# Patient Record
Sex: FEMALE
[De-identification: names, ages, dates, MRNs, and addresses within clinical notes are randomized per-mention and may not be internally consistent; named-entity substitution may affect disease eponyms.]

---

## 2022-11-24 ENCOUNTER — NON-APPOINTMENT (OUTPATIENT)
Age: 32
End: 2022-11-24

## 2024-06-19 ENCOUNTER — ASOB RESULT (OUTPATIENT)
Age: 34
End: 2024-06-19

## 2024-06-19 ENCOUNTER — APPOINTMENT (OUTPATIENT)
Dept: ANTEPARTUM | Facility: CLINIC | Age: 34
End: 2024-06-19
Payer: COMMERCIAL

## 2024-06-19 PROCEDURE — 36416 COLLJ CAPILLARY BLOOD SPEC: CPT

## 2024-06-19 PROCEDURE — 76813 OB US NUCHAL MEAS 1 GEST: CPT

## 2024-08-07 ENCOUNTER — APPOINTMENT (OUTPATIENT)
Dept: ANTEPARTUM | Facility: CLINIC | Age: 34
End: 2024-08-07

## 2024-08-07 ENCOUNTER — ASOB RESULT (OUTPATIENT)
Age: 34
End: 2024-08-07

## 2024-08-07 ENCOUNTER — TRANSCRIPTION ENCOUNTER (OUTPATIENT)
Age: 34
End: 2024-08-07

## 2024-08-07 PROCEDURE — 76811 OB US DETAILED SNGL FETUS: CPT

## 2024-08-14 ENCOUNTER — NON-APPOINTMENT (OUTPATIENT)
Age: 34
End: 2024-08-14

## 2024-08-15 ENCOUNTER — ASOB RESULT (OUTPATIENT)
Age: 34
End: 2024-08-15

## 2024-08-15 ENCOUNTER — APPOINTMENT (OUTPATIENT)
Dept: ANTEPARTUM | Facility: CLINIC | Age: 34
End: 2024-08-15

## 2024-08-15 PROCEDURE — 76816 OB US FOLLOW-UP PER FETUS: CPT

## 2024-10-08 ENCOUNTER — APPOINTMENT (OUTPATIENT)
Dept: ANTEPARTUM | Facility: CLINIC | Age: 34
End: 2024-10-08
Payer: COMMERCIAL

## 2024-10-08 ENCOUNTER — ASOB RESULT (OUTPATIENT)
Age: 34
End: 2024-10-08

## 2024-10-08 PROCEDURE — 76816 OB US FOLLOW-UP PER FETUS: CPT

## 2024-11-01 ENCOUNTER — ASOB RESULT (OUTPATIENT)
Age: 34
End: 2024-11-01

## 2024-11-01 ENCOUNTER — APPOINTMENT (OUTPATIENT)
Dept: MATERNAL FETAL MEDICINE | Facility: CLINIC | Age: 34
End: 2024-11-01
Payer: COMMERCIAL

## 2024-11-01 PROCEDURE — G0108 DIAB MANAGE TRN  PER INDIV: CPT | Mod: 95

## 2024-11-06 ENCOUNTER — ASOB RESULT (OUTPATIENT)
Age: 34
End: 2024-11-06

## 2024-11-06 ENCOUNTER — APPOINTMENT (OUTPATIENT)
Dept: ANTEPARTUM | Facility: CLINIC | Age: 34
End: 2024-11-06
Payer: COMMERCIAL

## 2024-11-06 PROCEDURE — 76819 FETAL BIOPHYS PROFIL W/O NST: CPT

## 2024-11-06 PROCEDURE — 76816 OB US FOLLOW-UP PER FETUS: CPT

## 2024-11-12 DIAGNOSIS — O24.419 GESTATIONAL DIABETES MELLITUS IN PREGNANCY, UNSPECIFIED CONTROL: ICD-10-CM

## 2024-11-12 RX ORDER — LANCETS 33 GAUGE
EACH MISCELLANEOUS
Qty: 1 | Refills: 2 | Status: ACTIVE | COMMUNITY
Start: 2024-11-12 | End: 1900-01-01

## 2024-11-12 RX ORDER — BLOOD-GLUCOSE METER
KIT MISCELLANEOUS
Qty: 2 | Refills: 2 | Status: ACTIVE | COMMUNITY
Start: 2024-11-12 | End: 1900-01-01

## 2024-11-15 ENCOUNTER — ASOB RESULT (OUTPATIENT)
Age: 34
End: 2024-11-15

## 2024-11-15 ENCOUNTER — APPOINTMENT (OUTPATIENT)
Dept: MATERNAL FETAL MEDICINE | Facility: CLINIC | Age: 34
End: 2024-11-15
Payer: COMMERCIAL

## 2024-11-15 PROCEDURE — G0108 DIAB MANAGE TRN  PER INDIV: CPT | Mod: 95

## 2024-11-15 RX ORDER — URINE ACETONE TEST STRIPS
STRIP MISCELLANEOUS
Qty: 1 | Refills: 2 | Status: ACTIVE | COMMUNITY
Start: 2024-11-15 | End: 1900-01-01

## 2024-11-29 ENCOUNTER — APPOINTMENT (OUTPATIENT)
Dept: ANTEPARTUM | Facility: CLINIC | Age: 34
End: 2024-11-29

## 2024-12-02 ENCOUNTER — ASOB RESULT (OUTPATIENT)
Age: 34
End: 2024-12-02

## 2024-12-02 ENCOUNTER — APPOINTMENT (OUTPATIENT)
Dept: MATERNAL FETAL MEDICINE | Facility: CLINIC | Age: 34
End: 2024-12-02
Payer: COMMERCIAL

## 2024-12-02 PROCEDURE — G0108 DIAB MANAGE TRN  PER INDIV: CPT | Mod: 95

## 2024-12-19 ENCOUNTER — INPATIENT (INPATIENT)
Facility: HOSPITAL | Age: 34
LOS: 3 days | Discharge: HOME | End: 2024-12-23
Attending: STUDENT IN AN ORGANIZED HEALTH CARE EDUCATION/TRAINING PROGRAM | Admitting: STUDENT IN AN ORGANIZED HEALTH CARE EDUCATION/TRAINING PROGRAM
Payer: COMMERCIAL

## 2024-12-19 VITALS
DIASTOLIC BLOOD PRESSURE: 65 MMHG | TEMPERATURE: 98 F | SYSTOLIC BLOOD PRESSURE: 108 MMHG | RESPIRATION RATE: 16 BRPM | HEART RATE: 93 BPM

## 2024-12-19 DIAGNOSIS — O24.419 GESTATIONAL DIABETES MELLITUS IN PREGNANCY, UNSPECIFIED CONTROL: ICD-10-CM

## 2024-12-19 LAB
BASOPHILS # BLD AUTO: 0.03 K/UL — SIGNIFICANT CHANGE UP (ref 0–0.2)
BASOPHILS NFR BLD AUTO: 0.3 % — SIGNIFICANT CHANGE UP (ref 0–2)
BLD GP AB SCN SERPL QL: NEGATIVE — SIGNIFICANT CHANGE UP
EOSINOPHIL # BLD AUTO: 0.12 K/UL — SIGNIFICANT CHANGE UP (ref 0–0.5)
EOSINOPHIL NFR BLD AUTO: 1.3 % — SIGNIFICANT CHANGE UP (ref 0–6)
GLUCOSE BLDC GLUCOMTR-MCNC: 85 MG/DL — SIGNIFICANT CHANGE UP (ref 70–99)
HCT VFR BLD CALC: 34.8 % — SIGNIFICANT CHANGE UP (ref 34.5–45)
HGB BLD-MCNC: 11.1 G/DL — LOW (ref 11.5–15.5)
IANC: 6.03 K/UL — SIGNIFICANT CHANGE UP (ref 1.8–7.4)
IMM GRANULOCYTES NFR BLD AUTO: 0.9 % — SIGNIFICANT CHANGE UP (ref 0–0.9)
LYMPHOCYTES # BLD AUTO: 2.29 K/UL — SIGNIFICANT CHANGE UP (ref 1–3.3)
LYMPHOCYTES # BLD AUTO: 24.4 % — SIGNIFICANT CHANGE UP (ref 13–44)
MCHC RBC-ENTMCNC: 29.4 PG — SIGNIFICANT CHANGE UP (ref 27–34)
MCHC RBC-ENTMCNC: 31.9 G/DL — LOW (ref 32–36)
MCV RBC AUTO: 92.3 FL — SIGNIFICANT CHANGE UP (ref 80–100)
MONOCYTES # BLD AUTO: 0.84 K/UL — SIGNIFICANT CHANGE UP (ref 0–0.9)
MONOCYTES NFR BLD AUTO: 8.9 % — SIGNIFICANT CHANGE UP (ref 2–14)
NEUTROPHILS # BLD AUTO: 6.03 K/UL — SIGNIFICANT CHANGE UP (ref 1.8–7.4)
NEUTROPHILS NFR BLD AUTO: 64.2 % — SIGNIFICANT CHANGE UP (ref 43–77)
NRBC # BLD: 0 /100 WBCS — SIGNIFICANT CHANGE UP (ref 0–0)
NRBC # FLD: 0 K/UL — SIGNIFICANT CHANGE UP (ref 0–0)
PLATELET # BLD AUTO: 183 K/UL — SIGNIFICANT CHANGE UP (ref 150–400)
RBC # BLD: 3.77 M/UL — LOW (ref 3.8–5.2)
RBC # FLD: 15.5 % — HIGH (ref 10.3–14.5)
RH IG SCN BLD-IMP: NEGATIVE — SIGNIFICANT CHANGE UP
WBC # BLD: 9.39 K/UL — SIGNIFICANT CHANGE UP (ref 3.8–10.5)
WBC # FLD AUTO: 9.39 K/UL — SIGNIFICANT CHANGE UP (ref 3.8–10.5)

## 2024-12-19 RX ORDER — CHLORHEXIDINE GLUCONATE 1.2 MG/ML
1 RINSE ORAL DAILY
Refills: 0 | Status: DISCONTINUED | OUTPATIENT
Start: 2024-12-19 | End: 2024-12-21

## 2024-12-19 RX ORDER — CITRIC ACID/SODIUM CITRATE 334-500MG
15 SOLUTION, ORAL ORAL EVERY 6 HOURS
Refills: 0 | Status: DISCONTINUED | OUTPATIENT
Start: 2024-12-19 | End: 2024-12-21

## 2024-12-19 RX ORDER — SODIUM CHLORIDE 9 MG/ML
1000 INJECTION, SOLUTION INTRAVENOUS
Refills: 0 | Status: DISCONTINUED | OUTPATIENT
Start: 2024-12-19 | End: 2024-12-22

## 2024-12-19 RX ORDER — SODIUM CHLORIDE 9 MG/ML
1000 INJECTION, SOLUTION INTRAVENOUS
Refills: 0 | Status: DISCONTINUED | OUTPATIENT
Start: 2024-12-19 | End: 2024-12-21

## 2024-12-19 RX ORDER — AMPICILLIN TRIHYDRATE 125 MG/5ML
2 SUSPENSION, RECONSTITUTED, ORAL (ML) ORAL ONCE
Refills: 0 | Status: COMPLETED | OUTPATIENT
Start: 2024-12-19 | End: 2024-12-19

## 2024-12-19 RX ORDER — OXYTOCIN IN 5 % DEXTROSE 20/500ML
167 PLASTIC BAG, INJECTION (ML) INTRAVENOUS
Qty: 30 | Refills: 0 | Status: DISCONTINUED | OUTPATIENT
Start: 2024-12-19 | End: 2024-12-22

## 2024-12-19 RX ORDER — AMPICILLIN TRIHYDRATE 125 MG/5ML
1 SUSPENSION, RECONSTITUTED, ORAL (ML) ORAL EVERY 4 HOURS
Refills: 0 | Status: DISCONTINUED | OUTPATIENT
Start: 2024-12-19 | End: 2024-12-21

## 2024-12-19 RX ADMIN — SODIUM CHLORIDE 125 MILLILITER(S): 9 INJECTION, SOLUTION INTRAVENOUS at 22:57

## 2024-12-19 RX ADMIN — Medication 200 GRAM(S): at 22:50

## 2024-12-19 NOTE — OB PROVIDER H&P - NSHPPHYSICALEXAM_GEN_ALL_CORE
T(C): 36.8 (12-19-24 @ 20:22), Max: 36.8 (12-19-24 @ 20:22)  HR: 100 (12-19-24 @ 20:22) (100 - 100)  BP: 122/74 (12-19-24 @ 20:22) (122/74 - 122/74)  RR: 16 (12-19-24 @ 20:22) (16 - 16)  SpO2: 97% (12-19-24 @ 20:22) (97% - 97%)    Gen: NAD, well-appearing, AAOx3   Abd: Soft, gravid  Ext: non-tender, non-edematous    SVE:    Bedside sono:  FHT:  [place]: T(C): 36.8 (12-19-24 @ 20:22), Max: 36.8 (12-19-24 @ 20:22)  HR: 100 (12-19-24 @ 20:22) (100 - 100)  BP: 122/74 (12-19-24 @ 20:22) (122/74 - 122/74)  RR: 16 (12-19-24 @ 20:22) (16 - 16)  SpO2: 97% (12-19-24 @ 20:22) (97% - 97%)    Gen: NAD, well-appearing, AAOx3   Abd: Soft, gravid  Ext: non-tender, non-edematous    SVE: .5/60/-3   Bedside sono: Vertex  FHT: Baseline 140, mod variability, +accels, -decels  [place]: q5min

## 2024-12-19 NOTE — OB RN PATIENT PROFILE - FALL HARM RISK - PATIENT NEEDS ASSISTANCE
Breast cancer    Hypertension    OA (osteoarthritis)    Ovarian cancer    SLE (systemic lupus erythematosus)  per patient, not noted on outpatient records
No assistance needed

## 2024-12-19 NOTE — OB PROVIDER H&P - ASSESSMENT
34y  at 39w4d presents to L&D for IOL i/s/o GDMA1.  -Admit to L&D  -Consent  -Admission labs  -NPO, except ice chips   -IV fluids  -Labor: Intact/*ROM. Latent/Active labor. Suzanne *.   -Fetus: Cat I tracing. Continuous toco and fetal monitoring.   -GBS: Negative, no GBS ppx required   -Analgesia:     Discussed with  _   34y  at 39w4d presents to L&D for IOL GDMA1.  -Admit to L&D  -Consent  -Admission labs  -NPO  -IV fluids  -Labor: 0.5/50/-3  -Fetus: Cat I tracing. Continuous toco and fetal monitoring.   -GBS: pos AMP  -Analgesia: Epidural PRN  Will plan for IOL with buccal cytotec.    Discussed with Dr. Jamie Faye, PGY-1

## 2024-12-19 NOTE — OB RN TRIAGE NOTE - FALL HARM RISK - RISK INTERVENTIONS

## 2024-12-19 NOTE — OB RN TRIAGE NOTE - TEMPERATURE IN FAHRENHEIT (DEGREES F)
Patient ID:  Ro Stone 12/31/2020  2:21 PM  [id number]  1991 29 year old    CHIEF COMPLAINT:  Dyspnea        Code status: Prior      This physician, recommends Ro Stone be admitted as an INPATIENT to the medical unit/ICU. The expected LOS exceeds 2 midnights. Reason(s) for INPATIENT CARE include acute hypoxemic respiratory failure secondary to COVID. based on severity of presenting sx, co-morbidities, and lab/imaging, this patient has an increased risk of adverse outcomes.        HPI:  Patient is a 29-year-old female with past medical history detailed below.  Patient presents with dyspnea and hypoxia.  Patient presented to outside clinic 2 days ago with dyspnea.  COVID swab was run.  She was given 1 dose of steroids and sent home.  Despite this patient's symptoms continue to worsen.  She was called today and told that her COVID was positive.  Patient's breathing is labored.  Desaturates to the mid 80s on room air.  Somewhat improved after nebulizer treatment given but still hypoxemic.  Workup showed COVID-19 pneumonia picture.  Denies additional symptoms other than fatigue.    PAST MEDICAL HISTORY:     Chronic nausea                                                Chronic abdominal pain                                        Extrinsic asthma                                              Anxiety                                                       Allergic rhinitis                                             Globus pharyngeus                                             Migraines                                                     Bronchitis                                      2017            Comment: acute     Gastroesophageal reflux disease                               Left ACL tear                                   05/29/2018    Localization-related epilepsy (CMS/HCC)                       Seizures (CMS/[facility])                                            History of postoperative nausea                                PAST SURGICAL HISTORY:    COLONOSCOPY                                     2012          ESOPHAGOGASTRODUODENOSCOPY                      2012          SHOULDER ARTHROSCOPY W/ LABRAL REPAIR           10/17/2016      Comment: Dr Zavala -extensive debridement     TUMOR EXCISION                                                  Comment: right upper chest    WRIST SURGERY                                   01/30/2018      Comment: arthroscopy w/debridement and FIRST DORSAL                COMPARTMENT RELEASE AND FLEXOR CARPI RADIALIS                INJECTION     BREAST SURGERY                                  08/08/2017      Comment: excision of breast nodule- Dr Alexy Moody                -benign     KNEE ARTHROSCOPY W/ ACL RECONSTRUCTION          03/2018       TONSILLECTOMY                                   12/18/2019    SINUS SURGERY                                   11/2019       FAMILY HISTORY:  Family History   Problem Relation Age of Onset   • Patient is unaware of any medical problems Mother    • Asthma Father    • Hypertension Father    • Cancer Father         bladder   • Asthma Sister    • Myocardial Infarction Paternal Grandmother    • Cancer Paternal Grandfather         eye cancer       SOCIAL HISTORY:  Social History     Socioeconomic History   • Marital status: /Civil Union     Spouse name: Not on file   • Number of children: Not on file   • Years of education: Not on file   • Highest education level: Not on file   Occupational History   • Occupation:      Comment: [employer]   Social Needs   • Financial resource strain: Not on file   • Food insecurity     Worry: Not on file     Inability: Not on file   • Transportation needs     Medical: Not on file     Non-medical: Not on file   Tobacco Use   • Smoking status: Never Smoker   • Smokeless tobacco: Never Used   Substance and Sexual Activity   • Alcohol use: Yes     Frequency: Never     Binge frequency: Never      Comment: 1 drink per month - if that    • Drug use: No   • Sexual activity: Never   Lifestyle   • Physical activity     Days per week: Not on file     Minutes per session: Not on file   • Stress: Not on file   Relationships   • Social connections     Talks on phone: Not on file     Gets together: Not on file     Attends Buddhist service: Not on file     Active member of club or organization: Not on file     Attends meetings of clubs or organizations: Not on file     Relationship status: Not on file   • Intimate partner violence     Fear of current or ex partner: Not on file     Emotionally abused: Not on file     Physically abused: Not on file     Forced sexual activity: Not on file   Other Topics Concern   • Not on file   Social History Narrative   • Not on file       MEDICATIONS:   (Not in a hospital admission)      ALLERGIES:  Allergies as of 12/31/2020 - Reviewed 12/29/2020   Allergen Reaction Noted   • Dander Other (See Comments) 10/19/2020   • Mite (d. farinae) Other (See Comments) 10/19/2020   • Pollen Other (See Comments) 10/19/2020       REVIEW OF SYSTEMS:  Positive for:  Dyspnea and fatigue  Denies: N/V/D/F/C/CP/palpitations  All other systems reviewed and negative.      PHYSICAL EXAM:  VITAL SIGNS:   Visit Vitals  /74   Pulse (!) 107   Temp 98.1 °F (36.7 °C)   Resp (!) 22   Wt 97 kg   LMP  (LMP Unknown)   SpO2 93%   BMI 36.71 kg/m²     GEN: Alert, oriented x 3, no acute distress. Well developed female appears stated age.  EYES: PERRLA, no scleral icterus, conjunctiva are pink.  ENT: Oral mucous membranes are moist. No oral lesions.  NECK: Supple. Trachea is midline. No thyromegaly.  CV: Regular rate and rhythm. No murmurs.  LUNGS:  Wheezes throughout lungs bilaterally.  Somewhat increased respiratory effort.  ABD: Soft, non-tender, non-distended, active bowel sounds. No HSM appreciated.  EXT: No edema, cyanosis or clubbing.  MS: Normal muscle mass and tone. Normal ROM.  SKIN: Warm and dry. No  katy.       LABS:   Lab Results   Component Value Date    SODIUM 142 12/31/2020    POTASSIUM 3.3 (L) 12/31/2020    CHLORIDE 104 12/31/2020    CO2 28 12/31/2020    GLUCOSE 88 12/31/2020    BUN 18 12/31/2020    CREATININE 0.85 12/31/2020    ALBUMIN 3.3 (L) 12/31/2020    BILIRUBIN 0.2 12/31/2020    LACTA 1.5 12/31/2020    AST 20 12/31/2020     Lab Results   Component Value Date    WBC 3.9 (L) 12/31/2020    HGB 11.7 (L) 12/31/2020    HCT 36.6 12/31/2020     12/31/2020    RAPDTR <0.02 12/31/2020    TSH 0.835 07/30/2020         DIAGNOSTICS:   Xr Chest Ap Or Pa - Portable    Result Date: 12/31/2020  Narrative: XR CHEST AP OR PA INDICATION:  SOB COMPARISON:  2 view chest August 2020, CT chest from July 2019. FINDINGS: Single view chest The lung volume is low. There are patchy areas of interstitial and airspace opacity identified in the lung bases. These are identified in retrocardiac region on the left and adjacent to the diaphragm on the right. The lungs elsewhere show crowded lung and low volume changes but no infiltrates. The heart size normal. The vascularity is normal. There is no pleural effusion.     Impression: IMPRESSION: Patchy bibasal atelectatic changes/infiltrative changes. These findings may be related to low volume chest but in light of the patient's positive Covid status these infiltrates could represent Covid related pneumonia.. 2. Low volume chest, normal heart and vascularity       ASSESSMENT AND PLAN:  1. Acute hypoxemic respiratory failure secondary to COVID pneumonia  -Decadron and remdesivir  -magnesium will be given x1  -COVID labs will be followed  2. Acute asthma exacerbation secondary to above  -Q 4 nebulizer inhalers  -continue home inhalers  -IV magnesium  3. Anxiety and depression  -continue BuSpar and Effexor  -Xanax as needed  4. Seizure disorder  -continue home medications  -Ativan as needed  5. Muscle spasms  -continue home muscle relaxer  6. Chronic iron deficiency  anemia  -continue daily iron supplementation  7. GERD  -ppi therapy  8. FEN/prophylaxis/disposition  -no IV fluids  -electrolyte replacement as needed  -SCDs, Lovenox  -general diet  -admit    Drew Rodriguez MD  12/31/2020  4:49 PM    97.5

## 2024-12-19 NOTE — OB RN PATIENT PROFILE - SUICIDE SCREENING QUESTION 2
Spoke with the patient's daughter and confirmed Evals: Waitlist appointments on Dec 2  and dec 3.  Informed patient an itinerary can be accessed via [facility], and will be sent via mail.   No

## 2024-12-19 NOTE — OB PROVIDER H&P - HISTORY OF PRESENT ILLNESS
34y G_P_ at _ weeks GA by LMP consistent with _ trimester sono who presents to L&D for _. Patient denies vaginal bleeding, contractions and leakage of fluid. She endorses good fetal movement. Denies fevers, chills, nausea, vomiting, chest pain, SOB, dizziness and headache. No other complaints at this time.   VICENTA: _  LMP: _  Prenatal course is significant for:  Prenatal course uncomplicated.      POB:  PGYN: -fibroids, -ovarian cysts, denies STD hx, denies abnormal PAPs   PMH: Denies  PSH: Denies  SH: Denies EtOH, tobacco and illicit drug use during this pregnancy; feels safe at home   Meds: PNVs  Allergies: NKDA    BMI:  Sono:  EFW:     T(C): 36.8 (12-19-24 @ 20:22), Max: 36.8 (12-19-24 @ 20:22)  HR: 100 (12-19-24 @ 20:22) (100 - 100)  BP: 122/74 (12-19-24 @ 20:22) (122/74 - 122/74)  RR: 16 (12-19-24 @ 20:22) (16 - 16)  SpO2: 97% (12-19-24 @ 20:22) (97% - 97%)    Gen: NAD, well-appearing, AAOx3   Abd: Soft, gravid  Ext: non-tender, non-edematous  SSE:   SVE:    Bedside sono:  FHT:  [place]:       A/P:   -Admit to L&D  -Consent  -Admission labs  -NPO, except ice chips   -IV fluids  -Labor: Intact/*ROM. Latent/Active labor. Suzanne *.   -Fetus: Cat I tracing. Continuous toco and fetal monitoring.   -GBS: Negative, no GBS ppx required   -Analgesia:     Discussed with  _ 34y  at 39w4d presents to L&D for IOL i/s/o GDMA1. Pt reports experiencing contractions, fetal movement present, denies leakage of fluid or vaginal bleeding. Denies fevers, chills, nausea, vomiting, chest pain, SOB, dizziness and headache. No other complaints at this time.     EDD1    PNC: GDMA1, GBSpos, EFW 3800g    POB: Denies  PGYN: -fibroids, -ovarian cysts, -endometriosis, denies STD hx, denies abnormal PAPs (last pap 2024)  PMH: anemia (s/p 5 iron infusions this pregnancy)  PSH: denies  SH: Denies EtOH, tobacco and illicit drug use during this pregnancy  Meds: PNVs, Aspirin 81mg, famotidine  Allergies: Watermelon (itchy skin + throat)   34y  at 39w4d presents to L&D for IOL for GDMA1. Pt reports experiencing contractions, fetal movement present, denies leakage of fluid or vaginal bleeding. Denies fevers, chills, nausea, vomiting, chest pain, SOB, dizziness and headache. No other complaints at this time.     EDD1    PNC: GDMA1, GBSpos, EFW 3800g    POB: Denies  PGYN: -fibroids, -ovarian cysts, -endometriosis, denies STD hx, denies abnormal PAPs (last pap 2024)  PMH: anemia (s/p 5 iron infusions this pregnancy)  PSH: denies  SH: Denies EtOH, tobacco and illicit drug use during this pregnancy  Meds: PNVs, Aspirin 81mg, famotidine  Allergies: Watermelon (itchy skin + throat)

## 2024-12-20 LAB
GLUCOSE BLDC GLUCOMTR-MCNC: 78 MG/DL — SIGNIFICANT CHANGE UP (ref 70–99)
GLUCOSE BLDC GLUCOMTR-MCNC: 82 MG/DL — SIGNIFICANT CHANGE UP (ref 70–99)
GLUCOSE BLDC GLUCOMTR-MCNC: 83 MG/DL — SIGNIFICANT CHANGE UP (ref 70–99)
GLUCOSE BLDC GLUCOMTR-MCNC: 88 MG/DL — SIGNIFICANT CHANGE UP (ref 70–99)
GLUCOSE BLDC GLUCOMTR-MCNC: 89 MG/DL — SIGNIFICANT CHANGE UP (ref 70–99)
GLUCOSE BLDC GLUCOMTR-MCNC: 91 MG/DL — SIGNIFICANT CHANGE UP (ref 70–99)
RH IG SCN BLD-IMP: NEGATIVE — SIGNIFICANT CHANGE UP
T PALLIDUM AB TITR SER: NEGATIVE — SIGNIFICANT CHANGE UP

## 2024-12-20 RX ORDER — FAMOTIDINE 20 MG/1
20 TABLET, FILM COATED ORAL ONCE
Refills: 0 | Status: COMPLETED | OUTPATIENT
Start: 2024-12-20 | End: 2024-12-20

## 2024-12-20 RX ORDER — OXYTOCIN IN 5 % DEXTROSE 20/500ML
2 PLASTIC BAG, INJECTION (ML) INTRAVENOUS
Qty: 30 | Refills: 0 | Status: DISCONTINUED | OUTPATIENT
Start: 2024-12-20 | End: 2024-12-22

## 2024-12-20 RX ADMIN — Medication 108 GRAM(S): at 14:11

## 2024-12-20 RX ADMIN — FAMOTIDINE 20 MILLIGRAM(S): 20 TABLET, FILM COATED ORAL at 01:32

## 2024-12-20 RX ADMIN — Medication 108 GRAM(S): at 02:05

## 2024-12-20 RX ADMIN — Medication 108 GRAM(S): at 18:07

## 2024-12-20 RX ADMIN — Medication 2 MILLIUNIT(S)/MIN: at 17:35

## 2024-12-20 RX ADMIN — Medication 108 GRAM(S): at 10:10

## 2024-12-20 RX ADMIN — CHLORHEXIDINE GLUCONATE 1 APPLICATION(S): 1.2 RINSE ORAL at 22:30

## 2024-12-20 RX ADMIN — Medication 108 GRAM(S): at 22:27

## 2024-12-20 RX ADMIN — SODIUM CHLORIDE 125 MILLILITER(S): 9 INJECTION, SOLUTION INTRAVENOUS at 04:10

## 2024-12-20 RX ADMIN — Medication 108 GRAM(S): at 06:09

## 2024-12-20 NOTE — OB PROVIDER LABOR PROGRESS NOTE - ASSESSMENT
34y  at 39w5d IOL GDMA1. CB now out    -Cat I tracing, ctx q3-5min  -Will plan to start pit  -Continue maternal/fetal monitoring.    D/W Dr. Arnie Hdz MD PGY1
Will continue with buccal cytotec    Plan per Dr. Jamie Faye, PGY-1

## 2024-12-20 NOTE — OB PROVIDER LABOR PROGRESS NOTE - NS_SUBJECTIVE/OBJECTIVE_OBGYN_ALL_OB_FT
Pt seen and examined at bedside for placement of cervical balloon. Pt comfortable and tolerated procedure well.
Patient laying comfortably supine, epidural in place. CB now out

## 2024-12-21 ENCOUNTER — TRANSCRIPTION ENCOUNTER (OUTPATIENT)
Age: 34
End: 2024-12-21

## 2024-12-21 LAB
ANION GAP SERPL CALC-SCNC: 15 MMOL/L — HIGH (ref 7–14)
BUN SERPL-MCNC: 6 MG/DL — LOW (ref 7–23)
CALCIUM SERPL-MCNC: 8.3 MG/DL — LOW (ref 8.4–10.5)
CHLORIDE SERPL-SCNC: 105 MMOL/L — SIGNIFICANT CHANGE UP (ref 98–107)
CO2 SERPL-SCNC: 14 MMOL/L — LOW (ref 22–31)
CREAT SERPL-MCNC: 0.69 MG/DL — SIGNIFICANT CHANGE UP (ref 0.5–1.3)
EGFR: 115 ML/MIN/1.73M2 — SIGNIFICANT CHANGE UP
GLUCOSE BLDC GLUCOMTR-MCNC: 108 MG/DL — HIGH (ref 70–99)
GLUCOSE BLDC GLUCOMTR-MCNC: 109 MG/DL — HIGH (ref 70–99)
GLUCOSE BLDC GLUCOMTR-MCNC: 87 MG/DL — SIGNIFICANT CHANGE UP (ref 70–99)
GLUCOSE BLDC GLUCOMTR-MCNC: 93 MG/DL — SIGNIFICANT CHANGE UP (ref 70–99)
GLUCOSE SERPL-MCNC: 92 MG/DL — SIGNIFICANT CHANGE UP (ref 70–99)
HCT VFR BLD CALC: 32.4 % — LOW (ref 34.5–45)
HGB BLD-MCNC: 10.8 G/DL — LOW (ref 11.5–15.5)
MCHC RBC-ENTMCNC: 30.2 PG — SIGNIFICANT CHANGE UP (ref 27–34)
MCHC RBC-ENTMCNC: 33.3 G/DL — SIGNIFICANT CHANGE UP (ref 32–36)
MCV RBC AUTO: 90.5 FL — SIGNIFICANT CHANGE UP (ref 80–100)
NRBC # BLD: 0 /100 WBCS — SIGNIFICANT CHANGE UP (ref 0–0)
NRBC # FLD: 0 K/UL — SIGNIFICANT CHANGE UP (ref 0–0)
PLATELET # BLD AUTO: 159 K/UL — SIGNIFICANT CHANGE UP (ref 150–400)
POTASSIUM SERPL-MCNC: 4.4 MMOL/L — SIGNIFICANT CHANGE UP (ref 3.5–5.3)
POTASSIUM SERPL-SCNC: 4.4 MMOL/L — SIGNIFICANT CHANGE UP (ref 3.5–5.3)
RBC # BLD: 3.58 M/UL — LOW (ref 3.8–5.2)
RBC # FLD: 15.1 % — HIGH (ref 10.3–14.5)
SODIUM SERPL-SCNC: 134 MMOL/L — LOW (ref 135–145)
WBC # BLD: 13.15 K/UL — HIGH (ref 3.8–10.5)
WBC # FLD AUTO: 13.15 K/UL — HIGH (ref 3.8–10.5)

## 2024-12-21 PROCEDURE — 93010 ELECTROCARDIOGRAM REPORT: CPT

## 2024-12-21 DEVICE — SURGICEL SNOW 2 X 4": Type: IMPLANTABLE DEVICE | Status: FUNCTIONAL

## 2024-12-21 RX ORDER — OXYCODONE HCL 15 MG
5 TABLET ORAL ONCE
Refills: 0 | Status: DISCONTINUED | OUTPATIENT
Start: 2024-12-21 | End: 2024-12-23

## 2024-12-21 RX ORDER — CLOSTRIDIUM TETANI TOXOID ANTIGEN (FORMALDEHYDE INACTIVATED), CORYNEBACTERIUM DIPHTHERIAE TOXOID ANTIGEN (FORMALDEHYDE INACTIVATED), BORDETELLA PERTUSSIS TOXOID ANTIGEN (GLUTARALDEHYDE INACTIVATED), BORDETELLA PERTUSSIS FILAMENTOUS HEMAGGLUTININ ANTIGEN (FORMALDEHYDE INACTIVATED), BORDETELLA PERTUSSIS PERTACTIN ANTIGEN, AND BORDETELLA PERTUSSIS FIMBRIAE 2/3 ANTIGEN 5; 2; 2.5; 5; 3; 5 [LF]/.5ML; [LF]/.5ML; UG/.5ML; UG/.5ML; UG/.5ML; UG/.5ML
0.5 INJECTION, SUSPENSION INTRAMUSCULAR ONCE
Refills: 0 | Status: DISCONTINUED | OUTPATIENT
Start: 2024-12-21 | End: 2024-12-23

## 2024-12-21 RX ORDER — CITRIC ACID/SODIUM CITRATE 334-500MG
30 SOLUTION, ORAL ORAL ONCE
Refills: 0 | Status: COMPLETED | OUTPATIENT
Start: 2024-12-21 | End: 2024-12-21

## 2024-12-21 RX ORDER — FAMOTIDINE 20 MG/1
20 TABLET, FILM COATED ORAL ONCE
Refills: 0 | Status: DISCONTINUED | OUTPATIENT
Start: 2024-12-21 | End: 2024-12-21

## 2024-12-21 RX ORDER — SIMETHICONE 125 MG/1
80 CAPSULE, LIQUID FILLED ORAL EVERY 4 HOURS
Refills: 0 | Status: DISCONTINUED | OUTPATIENT
Start: 2024-12-21 | End: 2024-12-23

## 2024-12-21 RX ORDER — SODIUM CHLORIDE 9 MG/ML
1000 INJECTION, SOLUTION INTRAVENOUS ONCE
Refills: 0 | Status: COMPLETED | OUTPATIENT
Start: 2024-12-21 | End: 2024-12-21

## 2024-12-21 RX ORDER — HEPARIN SODIUM 1000 [USP'U]/ML
5000 INJECTION, SOLUTION INTRAVENOUS; SUBCUTANEOUS EVERY 12 HOURS
Refills: 0 | Status: DISCONTINUED | OUTPATIENT
Start: 2024-12-21 | End: 2024-12-23

## 2024-12-21 RX ORDER — OXYTOCIN IN 5 % DEXTROSE 20/500ML
42 PLASTIC BAG, INJECTION (ML) INTRAVENOUS
Qty: 30 | Refills: 0 | Status: DISCONTINUED | OUTPATIENT
Start: 2024-12-21 | End: 2024-12-22

## 2024-12-21 RX ORDER — METHYLERGONOVINE MALEATE 0.2 MG/1
0.2 TABLET ORAL EVERY 4 HOURS
Refills: 0 | Status: COMPLETED | OUTPATIENT
Start: 2024-12-21 | End: 2024-12-22

## 2024-12-21 RX ORDER — OXYCODONE HCL 15 MG
5 TABLET ORAL
Refills: 0 | Status: DISCONTINUED | OUTPATIENT
Start: 2024-12-21 | End: 2024-12-22

## 2024-12-21 RX ORDER — MAGNESIUM HYDROXIDE 400 MG/5ML
30 SUSPENSION, ORAL (FINAL DOSE FORM) ORAL
Refills: 0 | Status: DISCONTINUED | OUTPATIENT
Start: 2024-12-21 | End: 2024-12-23

## 2024-12-21 RX ORDER — NALOXONE HCL 0.4 MG/ML
0.1 VIAL (ML) INJECTION
Refills: 0 | Status: DISCONTINUED | OUTPATIENT
Start: 2024-12-21 | End: 2024-12-22

## 2024-12-21 RX ORDER — PIPERACILLIN AND TAZOBACTAM 3; .375 G/15ML; G/15ML
4.5 INJECTION, POWDER, LYOPHILIZED, FOR SOLUTION INTRAVENOUS EVERY 8 HOURS
Refills: 0 | Status: DISCONTINUED | OUTPATIENT
Start: 2024-12-21 | End: 2024-12-22

## 2024-12-21 RX ORDER — CITRIC ACID/SODIUM CITRATE 334-500MG
30 SOLUTION, ORAL ORAL ONCE
Refills: 0 | Status: DISCONTINUED | OUTPATIENT
Start: 2024-12-21 | End: 2024-12-23

## 2024-12-21 RX ORDER — DIPHENHYDRAMINE HCL 25 MG
25 TABLET ORAL ONCE
Refills: 0 | Status: COMPLETED | OUTPATIENT
Start: 2024-12-21 | End: 2024-12-21

## 2024-12-21 RX ORDER — ONDANSETRON 4 MG/1
4 TABLET ORAL EVERY 6 HOURS
Refills: 0 | Status: DISCONTINUED | OUTPATIENT
Start: 2024-12-21 | End: 2024-12-22

## 2024-12-21 RX ORDER — DIPHENHYDRAMINE HCL 25 MG
25 TABLET ORAL EVERY 6 HOURS
Refills: 0 | Status: DISCONTINUED | OUTPATIENT
Start: 2024-12-21 | End: 2024-12-23

## 2024-12-21 RX ORDER — LANOLIN
1 OINTMENT (GRAM) TOPICAL EVERY 6 HOURS
Refills: 0 | Status: DISCONTINUED | OUTPATIENT
Start: 2024-12-21 | End: 2024-12-23

## 2024-12-21 RX ORDER — MORPHINE SULFATE 15 MG
2 TABLET, EXTENDED RELEASE ORAL ONCE
Refills: 0 | Status: DISCONTINUED | OUTPATIENT
Start: 2024-12-21 | End: 2024-12-22

## 2024-12-21 RX ORDER — IBUPROFEN 200 MG
600 TABLET ORAL EVERY 6 HOURS
Refills: 0 | Status: COMPLETED | OUTPATIENT
Start: 2024-12-21 | End: 2025-11-19

## 2024-12-21 RX ORDER — FAMOTIDINE 20 MG/1
20 TABLET, FILM COATED ORAL ONCE
Refills: 0 | Status: COMPLETED | OUTPATIENT
Start: 2024-12-21 | End: 2024-12-21

## 2024-12-21 RX ORDER — KETOROLAC TROMETHAMINE 30 MG/ML
30 INJECTION INTRAMUSCULAR; INTRAVENOUS EVERY 6 HOURS
Refills: 0 | Status: COMPLETED | OUTPATIENT
Start: 2024-12-21 | End: 2024-12-22

## 2024-12-21 RX ORDER — ACETAMINOPHEN 80 MG/.8ML
1000 SOLUTION/ DROPS ORAL ONCE
Refills: 0 | Status: COMPLETED | OUTPATIENT
Start: 2024-12-21 | End: 2024-12-21

## 2024-12-21 RX ORDER — OXYCODONE HCL 15 MG
5 TABLET ORAL
Refills: 0 | Status: DISCONTINUED | OUTPATIENT
Start: 2024-12-21 | End: 2024-12-23

## 2024-12-21 RX ORDER — ACETAMINOPHEN 80 MG/.8ML
975 SOLUTION/ DROPS ORAL
Refills: 0 | Status: DISCONTINUED | OUTPATIENT
Start: 2024-12-21 | End: 2024-12-23

## 2024-12-21 RX ORDER — SODIUM CHLORIDE 9 MG/ML
1000 INJECTION, SOLUTION INTRAVENOUS
Refills: 0 | Status: DISCONTINUED | OUTPATIENT
Start: 2024-12-21 | End: 2024-12-23

## 2024-12-21 RX ORDER — DEXAMETHASONE SODIUM PHOSPHATE 4 MG/ML
4 VIAL (ML) INJECTION EVERY 6 HOURS
Refills: 0 | Status: DISCONTINUED | OUTPATIENT
Start: 2024-12-21 | End: 2024-12-22

## 2024-12-21 RX ADMIN — FAMOTIDINE 20 MILLIGRAM(S): 20 TABLET, FILM COATED ORAL at 11:54

## 2024-12-21 RX ADMIN — SODIUM CHLORIDE 1000 MILLILITER(S): 9 INJECTION, SOLUTION INTRAVENOUS at 09:38

## 2024-12-21 RX ADMIN — Medication 25 MILLIGRAM(S): at 08:28

## 2024-12-21 RX ADMIN — Medication 108 GRAM(S): at 10:39

## 2024-12-21 RX ADMIN — Medication 108 GRAM(S): at 02:27

## 2024-12-21 RX ADMIN — KETOROLAC TROMETHAMINE 30 MILLIGRAM(S): 30 INJECTION INTRAMUSCULAR; INTRAVENOUS at 22:00

## 2024-12-21 RX ADMIN — CHLORHEXIDINE GLUCONATE 1 APPLICATION(S): 1.2 RINSE ORAL at 08:01

## 2024-12-21 RX ADMIN — Medication 15 MILLILITER(S): at 11:55

## 2024-12-21 RX ADMIN — Medication 30 MILLILITER(S): at 11:55

## 2024-12-21 RX ADMIN — METHYLERGONOVINE MALEATE 0.2 MILLIGRAM(S): 0.2 TABLET ORAL at 17:43

## 2024-12-21 RX ADMIN — ACETAMINOPHEN 400 MILLIGRAM(S): 80 SOLUTION/ DROPS ORAL at 12:19

## 2024-12-21 RX ADMIN — ACETAMINOPHEN 975 MILLIGRAM(S): 80 SOLUTION/ DROPS ORAL at 17:46

## 2024-12-21 RX ADMIN — ACETAMINOPHEN 1000 MILLIGRAM(S): 80 SOLUTION/ DROPS ORAL at 13:57

## 2024-12-21 RX ADMIN — Medication 108 GRAM(S): at 06:31

## 2024-12-21 RX ADMIN — HEPARIN SODIUM 5000 UNIT(S): 1000 INJECTION, SOLUTION INTRAVENOUS; SUBCUTANEOUS at 21:05

## 2024-12-21 RX ADMIN — KETOROLAC TROMETHAMINE 30 MILLIGRAM(S): 30 INJECTION INTRAMUSCULAR; INTRAVENOUS at 21:05

## 2024-12-21 RX ADMIN — PIPERACILLIN AND TAZOBACTAM 200 GRAM(S): 3; .375 INJECTION, POWDER, LYOPHILIZED, FOR SOLUTION INTRAVENOUS at 21:05

## 2024-12-21 RX ADMIN — METHYLERGONOVINE MALEATE 0.2 MILLIGRAM(S): 0.2 TABLET ORAL at 21:06

## 2024-12-21 RX ADMIN — SODIUM CHLORIDE 1000 MILLILITER(S): 9 INJECTION, SOLUTION INTRAVENOUS at 12:17

## 2024-12-21 RX ADMIN — PIPERACILLIN AND TAZOBACTAM 200 GRAM(S): 3; .375 INJECTION, POWDER, LYOPHILIZED, FOR SOLUTION INTRAVENOUS at 12:14

## 2024-12-21 NOTE — DISCHARGE NOTE OB - FINANCIAL ASSISTANCE
[facility] provides services at a reduced cost to those who are determined to be eligible through [facility]’s financial assistance program. Information regarding [facility]’s financial assistance program can be found by going to [email or web address] or by calling [phone].

## 2024-12-21 NOTE — DISCHARGE NOTE OB - NS MD DC FALL RISK RISK
Melissa Johnson is a 10year old male who was brought in for this visit. History was provided by the mother.   HPI:   Patient presents with:  Derm Problem:  veins bulging in temporal area off and on; they do not pulsate; they are not painful  No fevers; no ra bilaterally   Cardiovascular: Rate and rhythm are regular with no murmurs  Skin: No rashes; he does have larger veins on forearms    Results From Past 48 Hours:  No results found for this or any previous visit (from the past 48 hour(s)).     ASSESSMENT/PLAN For information on Fall & Injury Prevention, visit: [email or web address] OR  [email or web address] OR  https://www.cdc.gov/steadi/patient.html

## 2024-12-21 NOTE — DISCHARGE NOTE OB - CARE PLAN
1 Principal Discharge DX:	Status post primary low transverse  section  Assessment and plan of treatment:	as above   Principal Discharge DX:	Status post primary low transverse  section  Assessment and plan of treatment:	Routine PP care

## 2024-12-21 NOTE — DISCHARGE NOTE OB - MEDICATION SUMMARY - MEDICATIONS TO TAKE
I will START or STAY ON the medications listed below when I get home from the hospital:    ibuprofen 600 mg oral tablet  -- 1 tab(s) by mouth every 6 hours as needed for  moderate pain  -- Indication: For Pain     acetaminophen 325 mg oral tablet  -- 3 tab(s) by mouth every 6 hours as needed for  moderate pain  -- Indication: For Pain     Prenatal Multivitamins with Folic Acid 1 mg oral tablet  -- 1 tab(s) by mouth once a day  -- Indication: For Vitamins     ferrous sulfate 325 mg (65 mg elemental iron) oral tablet  -- 1 tab(s) by mouth once a day  -- Indication: For Vitamins

## 2024-12-21 NOTE — OB RN INTRAOPERATIVE NOTE - NSSELHIDDEN_OBGYN_ALL_OB_FT
[NS_DeliveryAttending1_OBGYN_ALL_OB_FT:[id number]],[NS_DeliveryAssist1_OBGYN_ALL_OB_FT:[id number]=],[NS_DeliveryRN_OBGYN_ALL_OB_FT:[id number]] [NS_DeliveryAttending1_OBGYN_ALL_OB_FT:[id number]=],[NS_DeliveryAssist1_OBGYN_ALL_OB_FT:[id number]=],[NS_DeliveryRN_OBGYN_ALL_OB_FT:[email or web address]=],[NS_DeliveryAttending2_OBGYN_ALL_OB_FT:[id number]]

## 2024-12-21 NOTE — OB NEONATOLOGY/PEDIATRICIAN DELIVERY SUMMARY - NSPEDSNEONOTESA_OBGYN_ALL_OB_FT
Peds called to OR with NICU resus team for chorio, cat II tracing, and difficult delievery for 39.6 wk AGA male born via CS to a 35 y/o  mother. Maternal medical/surgical/pregnancy history of GDMA1, chorio, and anemia. Maternal labs include Blood Type A-, HIV - , RPR NR , Rubella I , Hep B - , GBS + (received ampx10, Zosyn given <2h to delivery for fever). ROM at 16:51 on  with clear fluids (ROM hours: 20H19M).  Baby emerged limp and was warmed, dried, suctioned, and stimulated with APGARS of 7/9. Resuscitation included: deep suctioning and CPAP 5/30 max for ~10 minutes until improved oxygenation status achieved. Mom plans to initiate breastfeeding feed, consents Hep B vaccine and consents circ.  Highest maternal temp: 38.1 EOS 0.65.     BW: 3690    Physical Exam:  Gen: no acute distress, +grimace  HEENT:  anterior fontanel open soft and flat, nondysmorphic facies, no cleft lip/palate, ears normal set, no ear pits or tags, nares clinically patent  Resp: Normal respiratory effort without grunting or retractions, good air entry b/l, clear to auscultation bilaterally  Cardio: Present S1/S2, regular rate and rhythm, no murmurs  Abd: soft, non tender, non distended, umbilical cord with 3 vessels  Neuro: +palmar and plantar grasp, +suck, +milan, normal tone  Extremities: negative ray and ortolani maneuvers, moving all extremities, no clavicular crepitus or stepoff  Skin: pink, warm  Genitals: Normal male anatomy, testicles palpable in scrotum b/l, [place] 1, anus patent Peds called to OR with NICU resus team for chorio, cat II tracing, and difficult delivery for 39.6 wk AGA male born via CS with vacuum (1 pop off) to a 35 y/o  mother. Maternal medical/surgical/pregnancy history of GDMA1, chorio, and anemia. Maternal labs include Blood Type A-, HIV - , RPR NR , Rubella I , Hep B - , GBS + (received ampx10, Zosyn given <2h to delivery for fever). ROM at 16:51 on  with clear fluids (ROM hours: 20H19M).  Baby emerged limp and was warmed, dried, suctioned, and stimulated with APGARS of 7/9. Resuscitation included: deep suctioning and CPAP 5/30 max for ~10 minutes until improved oxygenation status achieved. Mom plans to initiate breastfeeding feed, consents Hep B vaccine and consents circ.  Highest maternal temp: 38.1 EOS 0.65.     BW: 3690    Physical Exam:  Gen: no acute distress, +grimace  HEENT:  anterior fontanel open soft and flat, nondysmorphic facies, no cleft lip/palate, ears normal set, no ear pits or tags, nares clinically patent  Resp: Normal respiratory effort without grunting or retractions, good air entry b/l, clear to auscultation bilaterally  Cardio: Present S1/S2, regular rate and rhythm, no murmurs  Abd: soft, non tender, non distended, umbilical cord with 3 vessels  Neuro: +palmar and plantar grasp, +suck, +milan, normal tone  Extremities: negative ray and ortolani maneuvers, moving all extremities, no clavicular crepitus or stepoff  Skin: pink, warm  Genitals: Normal male anatomy, testicles palpable in scrotum b/l, [place] 1, anus patent

## 2024-12-21 NOTE — OB PROVIDER DELIVERY SUMMARY - NSSELHIDDEN_OBGYN_ALL_OB_FT
[NS_DeliveryAttending1_OBGYN_ALL_OB_FT:[id number]],[NS_DeliveryAssist1_OBGYN_ALL_OB_FT:[id number]=],[NS_DeliveryRN_OBGYN_ALL_OB_FT:[email or web address]=],[NS_DeliveryAttending2_OBGYN_ALL_OB_FT:[id number]]

## 2024-12-21 NOTE — OB RN DELIVERY SUMMARY - NS_GBSABXNUMOFDOSES_OBGYN_ALL_OB_NU
RN cannot approve Refill Request    RN can NOT refill this medication Protocol failed and NO refill given. Last office visit: Visit date not found Last Physical: 10/3/2019 Last MTM visit: Visit date not found Last visit same specialty: Visit date not found.  Next visit within 3 mo: Visit date not found  Next physical within 3 mo: Visit date not found      Tess Herron, [facility] Triage/Med Refill 10/22/2020    Requested Prescriptions   Pending Prescriptions Disp Refills     alendronate (FOSAMAX) 70 MG tablet 12 tablet 3     Sig: TAKE 1 TABLET BY MOUTH ONCE A WEEK IN THE MORNING ON AN EMPTY STOMACH WITH  A  FULL  GLASS  OF  WATER,  30  MINUTES  BEFORE  FOOD       Biphosphonates Refill Protocol Failed - 10/21/2020  3:42 PM        Failed - PCP or prescribing provider visit in last 12 months     Last office visit with prescriber/PCP: Visit date not found OR same dept: Visit date not found OR same specialty: Visit date not found  Last physical: 10/3/2019 Last MTM visit: Visit date not found   Next visit within 3 mo: Visit date not found  Next physical within 3 mo: Visit date not found  Prescriber OR PCP: Rajiv Moe MD  Last diagnosis associated with med order: 1. Low bone mass  - alendronate (FOSAMAX) 70 MG tablet; TAKE 1 TABLET BY MOUTH ONCE A WEEK IN THE MORNING ON AN EMPTY STOMACH WITH  A  FULL  GLASS  OF  WATER,  30  MINUTES  BEFORE  FOOD  Dispense: 12 tablet; Refill: 3    2. HTN (hypertension)  - hydroCHLOROthiazide (HYDRODIURIL) 25 MG tablet; TAKE 1 TABLET BY MOUTH ONCE DAILY  Dispense: 90 tablet; Refill: 3    3. Hyperlipidemia  - atorvastatin (LIPITOR) 20 MG tablet; Take 1 tablet (20 mg total) by mouth daily.  Dispense: 90 tablet; Refill: 3    4. Hip pain  - naproxen (NAPROSYN) 375 MG tablet; TAKE ONE TABLET BY MOUTH TWICE DAILY WITH FOOD AS NEEDED FOR PAIN  Dispense: 30 tablet; Refill: 2    If protocol passes may refill for 12 months if within 3 months of last provider visit (or a total of 15 months).              Failed - Serum creatinine in last 12 months     Creatinine   Date Value Ref Range Status   10/03/2019 0.80 0.60 - 1.10 mg/dL Final                hydroCHLOROthiazide (HYDRODIURIL) 25 MG tablet 90 tablet 3     Sig: TAKE 1 TABLET BY MOUTH ONCE DAILY       Diuretics/Combination Diuretics Refill Protocol  Failed - 10/21/2020  3:42 PM        Failed - Visit with PCP or prescribing provider visit in past 12 months     Last office visit with prescriber/PCP: Visit date not found OR same dept: Visit date not found OR same specialty: Visit date not found  Last physical: 10/3/2019 Last MTM visit: Visit date not found   Next visit within 3 mo: Visit date not found  Next physical within 3 mo: Visit date not found  Prescriber OR PCP: Rajiv Moe MD  Last diagnosis associated with med order: 1. Low bone mass  - alendronate (FOSAMAX) 70 MG tablet; TAKE 1 TABLET BY MOUTH ONCE A WEEK IN THE MORNING ON AN EMPTY STOMACH WITH  A  FULL  GLASS  OF  WATER,  30  MINUTES  BEFORE  FOOD  Dispense: 12 tablet; Refill: 3    2. HTN (hypertension)  - hydroCHLOROthiazide (HYDRODIURIL) 25 MG tablet; TAKE 1 TABLET BY MOUTH ONCE DAILY  Dispense: 90 tablet; Refill: 3    3. Hyperlipidemia  - atorvastatin (LIPITOR) 20 MG tablet; Take 1 tablet (20 mg total) by mouth daily.  Dispense: 90 tablet; Refill: 3    4. Hip pain  - naproxen (NAPROSYN) 375 MG tablet; TAKE ONE TABLET BY MOUTH TWICE DAILY WITH FOOD AS NEEDED FOR PAIN  Dispense: 30 tablet; Refill: 2    If protocol passes may refill for 12 months if within 3 months of last provider visit (or a total of 15 months).             Failed - Serum Potassium in past 12 months      No results found for: LN-POTASSIUM          Failed - Serum Sodium in past 12 months      No results found for: LN-SODIUM          Failed - Blood pressure on file in past 12 months     BP Readings from Last 1 Encounters:   10/03/19 138/82             Failed - Serum Creatinine in past 12 months      Creatinine   Date Value  Ref Range Status   10/03/2019 0.80 0.60 - 1.10 mg/dL Final                atorvastatin (LIPITOR) 20 MG tablet 90 tablet 3     Sig: Take 1 tablet (20 mg total) by mouth daily.       Statins Refill Protocol (Hmg CoA Reductase Inhibitors) Failed - 10/21/2020  3:42 PM        Failed - PCP or prescribing provider visit in past 12 months      Last office visit with prescriber/PCP: Visit date not found OR same dept: Visit date not found OR same specialty: Visit date not found  Last physical: 10/3/2019 Last MTM visit: Visit date not found   Next visit within 3 mo: Visit date not found  Next physical within 3 mo: Visit date not found  Prescriber OR PCP: Rajiv Moe MD  Last diagnosis associated with med order: 1. Low bone mass  - alendronate (FOSAMAX) 70 MG tablet; TAKE 1 TABLET BY MOUTH ONCE A WEEK IN THE MORNING ON AN EMPTY STOMACH WITH  A  FULL  GLASS  OF  WATER,  30  MINUTES  BEFORE  FOOD  Dispense: 12 tablet; Refill: 3    2. HTN (hypertension)  - hydroCHLOROthiazide (HYDRODIURIL) 25 MG tablet; TAKE 1 TABLET BY MOUTH ONCE DAILY  Dispense: 90 tablet; Refill: 3    3. Hyperlipidemia  - atorvastatin (LIPITOR) 20 MG tablet; Take 1 tablet (20 mg total) by mouth daily.  Dispense: 90 tablet; Refill: 3    4. Hip pain  - naproxen (NAPROSYN) 375 MG tablet; TAKE ONE TABLET BY MOUTH TWICE DAILY WITH FOOD AS NEEDED FOR PAIN  Dispense: 30 tablet; Refill: 2    If protocol passes may refill for 12 months if within 3 months of last provider visit (or a total of 15 months).                naproxen (NAPROSYN) 375 MG tablet 30 tablet 2     Sig: TAKE ONE TABLET BY MOUTH TWICE DAILY WITH FOOD AS NEEDED FOR PAIN       There is no refill protocol information for this order            10

## 2024-12-21 NOTE — OB RN DELIVERY SUMMARY - NSSELHIDDEN_OBGYN_ALL_OB_FT
[NS_DeliveryAttending1_OBGYN_ALL_OB_FT:[id number]],[NS_DeliveryAssist1_OBGYN_ALL_OB_FT:[id number]=],[NS_DeliveryRN_OBGYN_ALL_OB_FT:[id number]=],[NS_DeliveryAttending2_OBGYN_ALL_OB_FT:[id number]]

## 2024-12-21 NOTE — OB PROVIDER DELIVERY SUMMARY - NSANESTHESIACS_OBGYN_ALL_OB
Epidural O-T Plasty Text: The defect edges were debeveled with a #15 scalpel blade.  Given the location of the defect, shape of the defect and the proximity to free margins an O-T plasty was deemed most appropriate.  Using a sterile surgical marker, an appropriate O-T plasty was drawn incorporating the defect and placing the expected incisions within the relaxed skin tension lines where possible.    The area thus outlined was incised deep to adipose tissue with a #15 scalpel blade.  The skin margins were undermined to an appropriate distance in all directions utilizing iris scissors.

## 2024-12-21 NOTE — DISCHARGE NOTE OB - PAIN IN THE CALVES OF YOUR LEGS
PHYSICIAN ORDERS      DATE & TIME ISSUED: 2024 7:16 AM  PATIENT NAME: Chet Cervantes   : 1961     [facility] MR# [if applicable]: [id number]     DIAGNOSIS:  Kidney/Pancreas Transplant  ICD-10 CODE: Z94.0     Please repeat the following labs:  Tacrolimus drug level (12 hour trough)  Sirolimus drug level (12 hour trough)  CBC  BMP  Amylase  Lipase    Any questions please call: [phone]    Please fax each result same day as resulted/available    Critical lab results page [phone]  Please fax lab results to [phone].    .       Statement Selected

## 2024-12-21 NOTE — DISCHARGE NOTE OB - CARE PROVIDER_API CALL
Soledad Garcia  Obstetrics and Gynecology  [address] [place] [id number]  Phone: [phone]  Fax: [phone]  Follow Up Time:

## 2024-12-21 NOTE — OB PROVIDER DELIVERY SUMMARY - NSNUMBEROFNEWBORNS_OBGYN_ALL_OB_NU
ROCIO NATION  76y, Male  Allergy: sulfa drugs (Other)    Hospital Day: 5d    Patient seen and examined. Doing well, no acute complaints. FAHAD.     PMH/PSH:  PAST MEDICAL & SURGICAL HISTORY:  GERD (gastroesophageal reflux disease): intermittent  H/O ptosis of eyelid: right eye (sometimes wears patch)  Hematuria  Hyperlipidemia  Anemia  Diabetes  Renal insufficiency  Cardiomyopathy  Cholelithiasis  Hydrocele in adult  Glaucoma  BPH (benign prostatic hyperplasia)  CAD (coronary artery disease): CARD STENT X2 4/2018  TIA (transient ischemic attack): X2 JULY 2018  Dementia  CHF (congestive heart failure): COMBINED SYSTOLIC &amp; DIASTOLIC  Afib  Heart attack: 1/2018  Sepsis: 1/18 FROM INFECTED GALLBLADDER  Hypercholesteremia  HTN (hypertension)  DM (diabetes mellitus)  History of suprapubic catheter  H/O flexible sigmoidoscopy: 2015  H/O bilateral cataract extraction: LEFT- 1/18 RIGHT 6 YRS AGO  Encounter for biliary drainage tube placement: 1/2018  H/O coronary angioplasty: WITH STENT X2 (4/2018)  History of prostate surgery: 5/17  AICD (automatic cardioverter/defibrillator) present: [employer]      LAST 24-Hr EVENTS:    VITALS:  T(F): 96 (07-16-20 @ 13:46), Max: 97.3 (07-16-20 @ 05:00)  HR: 56 (07-16-20 @ 13:46)  BP: 113/51 (07-16-20 @ 13:46) (113/51 - 150/68)  RR: 16 (07-16-20 @ 13:46)  SpO2: --        TESTS & MEASUREMENTS:  Weight (Kg):       07-14-20 @ 07:01  -  07-15-20 @ 07:00  --------------------------------------------------------  IN: 0 mL / OUT: 1400 mL / NET: -1400 mL    07-15-20 @ 07:01  -  07-16-20 @ 07:00  --------------------------------------------------------  IN: 0 mL / OUT: 750 mL / NET: -750 mL                            7.2    10.52 )-----------( 297      ( 16 Jul 2020 07:22 )             21.5       07-16    126<L>  |  86<L>  |  106<HH>  ----------------------------<  122<H>  4.3   |  20  |  8.6<HH>    Ca    6.9<L>      16 Jul 2020 07:22  Phos  7.3     07-15  Mg     1.8     07-15              Culture - Blood (collected 07-14-20 @ 06:09)  Source: .Blood None  Preliminary Report (07-15-20 @ 13:01):    No growth to date.    Culture - Urine (collected 07-11-20 @ 00:08)  Source: .Urine Suprapubic  Final Report (07-13-20 @ 16:37):    Numerous Pseudomonas aeruginosa (Carbapenem Resistant)    Few Klebsiella pneumoniae ESBL  Organism: Pseudomonas aeruginosa (Carbapenem Resistant)  Klebsiella pneumoniae ESBL (07-14-20 @ 14:52)  Organism: Klebsiella pneumoniae ESBL (07-14-20 @ 14:52)      -  Amikacin: S <=16      -  Amoxicillin/Clavulanic Acid: I 16/8      -  Ampicillin: R >16 These ampicillin results predict results for amoxicillin      -  Ampicillin/Sulbactam: R >16/8 Enterobacter, Citrobacter, and Serratia may develop resistance during prolonged therapy (3-4 days)      -  Aztreonam: R >16      -  Cefazolin: R >16 (MIC_CL_COM_ENTERIC_CEFAZU) For uncomplicated UTI with K. pneumoniae, E. coli, or P. mirablis: SATHYA <=16 is sensitive and SATHYA >=32 is resistant. This also predicts results for oral agents cefaclor, cefdinir, cefpodoxime, cefprozil, cefuroxime axetil, cephalexin and locarbef for uncomplicated UTI. Note that some isolates may be susceptible to these agents while testing resistant to cefazolin.      -  Cefepime: R >16      -  Cefoxitin: S <=8      -  Ceftriaxone: R >32 Enterobacter, Citrobacter, and Serratia may develop resistance during prolonged therapy      -  Ciprofloxacin: S <=0.25      -  Ertapenem: S <=0.5      -  Gentamicin: R >8      -  Imipenem: S <=1      -  Levofloxacin: S <=0.5      -  Meropenem: S <=1      -  Nitrofurantoin: I 64 Should not be used to treat pyelonephritis      -  Piperacillin/Tazobactam: R <=8      -  Tigecycline: S <=2      -  Tobramycin: R >8      -  Trimethoprim/Sulfamethoxazole: R >2/38      Method Type: SATHYA  Organism: Pseudomonas aeruginosa (Carbapenem Resistant) (07-14-20 @ 14:52)      -  Amikacin: S <=16      -  Aztreonam: R >16      -  Cefepime: R >16      -  Ceftazidime: I 16      -  Ceftazidime/Avibactam: R 16      -  Ceftolozane/tazobactam: S <=2      -  Ciprofloxacin: R >2      -  Gentamicin: S 4      -  Imipenem: R 8      -  Levofloxacin: R >4      -  Meropenem: R >8      -  Meropenem/Vaborbactam: 16      -  Piperacillin/Tazobactam: I 32      -  Tobramycin: S <=2      Method Type: SATHYA    Culture - Blood (collected 07-10-20 @ 22:53)  Source: .Blood Blood  Gram Stain (07-12-20 @ 18:28):    Growth in aerobic bottle: Gram Negative Rods  Final Report (07-15-20 @ 09:53):    Growth in aerobic bottle: Pseudomonas aeruginosa (Carbapenem Resistant)    "Due to technical problems, Proteus sp. will Not be reported as part of    the BCID panel until further notice"    ***Blood Panel PCR results on this specimen are available    approximately 3 hours after the Gram stain result.***    Gram stain, PCR, and/or culture results may not always    correspond due to difference in methodologies.    ************************************************************    This PCR assay was performed using [employer].    The following targets are tested for: Enterococcus,    vancomycin resistant enterococci, Listeria monocytogenes,    coagulase negative staphylococci, S. aureus,    methicillin resistant S. aureus, Streptococcus agalactiae    (Group B), S. pneumoniae, S. pyogenes (Group A),    Acinetobacter baumannii, Enterobacter cloacae, E. coli,    Klebsiella oxytoca, K. pneumoniae, Proteus sp.,    Serratia marcescens, Haemophilus influenzae,    Neisseria meningitidis, Pseudomonas aeruginosa, Candida    albicans, C. glabrata, C krusei, C parapsilosis,    C. tropicalis and the KPC resistance gene.  Organism: Blood Culture PCR  Pseudomonas aeruginosa (Carbapenem Resistant) (07-15-20 @ 09:53)  Organism: Pseudomonas aeruginosa (Carbapenem Resistant) (07-15-20 @ 09:53)      -  Amikacin: S <=16      -  Aztreonam: R >16      -  Cefepime: I 16      -  Ceftazidime: S 8      -  Ciprofloxacin: R 2      -  Gentamicin: I 8      -  Imipenem: R >8      -  Levofloxacin: R >4      -  Meropenem: R >8      -  Piperacillin/Tazobactam: I 32      -  Tobramycin: S <=2      Method Type: SATHYA  Organism: Blood Culture PCR (07-15-20 @ 09:53)      -  Pseudomonas aeruginosa: Detec      Method Type: PCR    Culture - Blood (collected 07-10-20 @ 22:53)  Source: .Blood Blood  Preliminary Report (07-12-20 @ 18:01):    No growth to date.                    RADIOLOGY, ECG, & ADDITIONAL TESTS:      RECENT DIAGNOSTIC ORDERS:  Complete Blood Count: AM Sched. Collection: 17-Jul-2020 04:30 (07-16-20 @ 15:12)  Basic Metabolic Panel: AM Sched. Collection: 17-Jul-2020 04:30 (07-16-20 @ 15:12)  Magnesium, Serum: AM Sched. Collection: 17-Jul-2020 04:30 (07-16-20 @ 15:12)  Phosphorus Level, Serum: AM Sched. Collection: 17-Jul-2020 04:30 (07-16-20 @ 15:12)  Protein Electrophoresis, Urine: Routine (07-16-20 @ 15:14)      MEDICATIONS:  MEDICATIONS  (STANDING):  apixaban 2.5 milliGRAM(s) Oral two times a day  aspirin enteric coated 81 milliGRAM(s) Oral daily  atorvastatin 40 milliGRAM(s) Oral at bedtime  brimonidine 0.2% Ophthalmic Solution 1 Drop(s) Both EYES at bedtime  calcitriol   Capsule 0.5 MICROGram(s) Oral daily  calcium acetate 667 milliGRAM(s) Oral daily  calcium gluconate IVPB 1 Gram(s) IV Intermittent once  ceftolozane/tazobactam IVPB 375 milliGRAM(s) IV Intermittent <User Schedule>  chlorhexidine 4% Liquid 1 Application(s) Topical <User Schedule>  dextrose 5%. 1000 milliLiter(s) (50 mL/Hr) IV Continuous <Continuous>  dextrose 50% Injectable 12.5 Gram(s) IV Push once  dextrose 50% Injectable 25 Gram(s) IV Push once  dextrose 50% Injectable 25 Gram(s) IV Push once  hydrALAZINE 50 milliGRAM(s) Oral three times a day  insulin lispro (HumaLOG) corrective regimen sliding scale   SubCutaneous three times a day before meals  insulin lispro (HumaLOG) corrective regimen sliding scale   SubCutaneous at bedtime  latanoprost 0.005% Ophthalmic Solution 1 Drop(s) Both EYES at bedtime  metoprolol tartrate 100 milliGRAM(s) Oral two times a day  NIFEdipine XL 90 milliGRAM(s) Oral daily  pantoprazole    Tablet 40 milliGRAM(s) Oral before breakfast  predniSONE   Tablet 60 milliGRAM(s) Oral daily  sodium bicarbonate 650 milliGRAM(s) Oral three times a day    MEDICATIONS  (PRN):  acetaminophen   Tablet .. 650 milliGRAM(s) Oral every 6 hours PRN Mild Pain (1 - 3)  dextrose 40% Gel 15 Gram(s) Oral once PRN Blood Glucose LESS THAN 70 milliGRAM(s)/deciliter  glucagon  Injectable 1 milliGRAM(s) IntraMuscular once PRN Glucose LESS THAN 70 milligrams/deciliter  ondansetron Injectable 8 milliGRAM(s) IV Push three times a day PRN Nausea and/or Vomiting  oxyCODONE    IR 5 milliGRAM(s) Oral four times a day PRN Severe Pain (7 - 10)      HOME MEDICATIONS:  Alphagan P 0.1% ophthalmic solution (05-19)  apixaban 2.5 mg oral tablet (05-23)  Aspir 81 oral delayed release tablet (05-19)  atorvastatin 40 mg oral tablet (05-19)  ciprofloxacin 250 mg oral tablet (05-27)  hydrALAZINE 50 mg oral tablet (05-27)  latanoprost 0.005% ophthalmic solution (05-19)  lidocaine 2% topical gel with applicator (05-27)  metoprolol tartrate 100 mg oral tablet (05-27)  NIFEdipine 90 mg oral tablet, extended release (05-27)  oxyCODONE 5 mg oral tablet (05-19)  pantoprazole 40 mg oral delayed release tablet (05-19)  sodium bicarbonate 650 mg oral tablet (05-19)      PHYSICAL EXAM:  GENERAL: A&O x3,  in NAD  HNENT: EOMI, PERRLA    NECK: No LAD/swelling  CHEST/LUNG:  CTAB no wheezes/rales/ronchi  HEART: RRR, No murmurs  ABDOMEN: Soft,mild ttp suprapubic region, SPC in place   EXTREMITIES:  Warm well perfused 1

## 2024-12-21 NOTE — OB RN DELIVERY SUMMARY - NS_SEPSISRSKCALC_OBGYN_ALL_OB_FT
EOS calculated successfully. EOS Risk Factor: 0.5/1000 live births ([facility] national incidence); GA=39w6d; Temp=100.58; ROM=20.317; GBS='Positive'; Antibiotics='GBS specific antibiotics > 2 hrs prior to birth'

## 2024-12-21 NOTE — CHART NOTE - NSCHARTNOTEFT_GEN_A_CORE
patient assessed, CB still in place with cervix around it. c/w cytotec induction. pt now comfortable on epidural. c/w monitoring
PTA    Primary RN, charge RN, PGY-4, PGY-3, Dr Garcia  PTA was done with the team due to Arrest of dilation, intermittent Cat 2 tracing  Indication: CAT II, arrest of dilation  Plan: Reexamine  Maternal Resuscitation  continue Pitocin  possible  delivery

## 2024-12-21 NOTE — DISCHARGE NOTE OB - BREAST MILK SUPPORTS STABLE NEWBORN BLOOD SUGAR
[facility]    Mr. Janiya Bell is 69 y.o. male with a history of possible bicuspid aortic valve, aortic stenosis, hypertension    Patient is here today for follow-up appointment. He is doing very well since last time. He denies any chest pain or chest tightness to be concern of angina. He denies any shortness of breath to be concern of heart failure. He denies PND or lower extremity swelling. He denies any palpitation, presyncope or syncope. Overall he has no complaint. He is able to do activity of daily living without any symptoms    Past Medical History:   Diagnosis Date    Aortic stenosis     mean gradient 32 mm Hg (2020)    Bicuspid aortic valve     HLD (hyperlipidemia)     Hypertension          Past Surgical History:   Procedure Laterality Date    ENDOSCOPY, COLON, DIAGNOSTIC  2003    benighn  polyps    HX VASECTOMY  1997    [place] LEG/ANKLE SURGERY PROC UNLISTED  1992    left ankle fx       Current Outpatient Medications   Medication Sig    lisinopril-hydroCHLOROthiazide (PRINZIDE, ZESTORETIC) 20-12.5 mg per tablet TAKE 1 TABLET BY MOUTH EVERY MORNING    amLODIPine (NORVASC) 5 mg tablet Take 1 Tablet by mouth daily.  atorvastatin (LIPITOR) 20 mg tablet TAKE 1 TABLET BY MOUTH DAILY    cholecalciferol (Vitamin D3) (1000 Units /25 mcg) tablet Take 1,000 Units by mouth daily.  ascorbic acid, vitamin C, (Vitamin C) 1,000 mg tablet Take 1,000 mg by mouth daily.  B.ani/L.aci/L.sal/L.plan/L. Shan (PROBIOTIC FORMULA PO) Take  by mouth.  vitamin e (E GEMS) 1,000 unit capsule Take 1,000 Units by mouth daily. No current facility-administered medications for this visit.        Allergies and Sensitivities:  No Known Allergies    Family History:  Family History   Problem Relation Age of Onset    Heart Disease Mother     Diabetes Sister        Social History:  Social History     Tobacco Use    Smoking status: Former Smoker Packs/day: 0.10     Types: Cigarettes     Quit date: 12/15/2020     Years since quittin.0    Smokeless tobacco: Never Used    Tobacco comment: 1 pack a week since 22 yrs old   Vaping Use    Vaping Use: Never used   Substance Use Topics    Alcohol use: Yes     Alcohol/week: 2.0 standard drinks     Types: 2 Cans of beer per week     Comment: 2-3 per week beer    Drug use: Never     He  reports that he quit smoking about a year ago. His smoking use included cigarettes. He smoked 0.10 packs per day. He has never used smokeless tobacco.  He  reports current alcohol use of about 2.0 standard drinks of alcohol per week. Review of Systems:  Cardiac symptoms as noted above in HPI. All others negative. Denies fatigue, malaise, skin rash, joint pain, blurring vision, photophobia, neck pain, hemoptysis, chronic cough, nausea, vomiting, hematuria, burning micturition, BRBPR, chronic headaches. Physical Exam:  BP Readings from Last 3 Encounters:   21 (!) 145/75   21 (!) 151/73   21 119/72         Pulse Readings from Last 3 Encounters:   21 68   21 71   21 82          Wt Readings from Last 3 Encounters:   21 75.3 kg (166 lb)   21 75.1 kg (165 lb 9.6 oz)   21 72.8 kg (160 lb 6.4 oz)       Constitutional: Oriented to person, place, and time. HENT: Head: Normocephalic and atraumatic. Neck: No JVD present. Cardiovascular: Regular rhythm. No  gallop or rubs appreciated. LIZ aortic stenosis 3/6, carotid radiation  Lung: Breath sounds normal. No respiratory distress. No ronchi or rales appreciated  Abdominal: No tenderness. No rebound and no guarding. Musculoskeletal: There is no lower extremity edema.  No cynosis        Review of Data  LABS:   Lab Results   Component Value Date/Time    Sodium 140 2021 08:22 AM    Potassium 4.5 2021 08:22 AM    Chloride 107 2021 08:22 AM    CO2 28 2021 08:22 AM    Glucose 88 2021 08:22 AM    BUN 19 (H) 11/05/2021 08:22 AM    Creatinine 0.94 11/05/2021 08:22 AM     Lipids Latest Ref Rng & Units 11/5/2021 8/3/2021 10/21/2020 6/26/2020 2/17/2020   Chol, Total <200 MG/ 154 138 159 152   HDL 40 - 60 MG/DL 47 42 42 47 36(L)   LDL 0 - 100 MG/DL 87.8 101. 2(H) 82 97.2 101. 6(H)   Trig <150 MG/DL 56 54 70 74 72   Chol/HDL Ratio 0 - 5.0   3.1 3.7 3.3 3.4 4.2   Some recent data might be hidden     Lab Results   Component Value Date/Time    ALT (SGPT) 25 11/05/2021 08:22 AM     Lab Results   Component Value Date/Time    Hemoglobin A1c 5.3 11/05/2021 08:22 AM       EKG    ECHO (2020)  Left Ventricle Normal cavity size and systolic function (ejection fraction normal). Hypertrophy . Mild septal wall hypertrophy. The estimated ejection fraction is 55 - 60%. Visually measured ejection fraction. LV wall motion is noted to be no regional wall motion abnormality. There is mild (grade 1) left ventricular diastolic dysfunction. Wall Scoring The left ventricular wall motion is normal.            Left Atrium Mildly dilated left atrium. Left Atrium volume index is 38.32 mL/m2. Right Ventricle Normal cavity size and global systolic function. Right Atrium Normal cavity size. Aortic Valve Probably trileaflet aortic valve. There is leaflet calcification. Aortic valve peak gradient is 68 mmHg. Aortic valve mean gradient is 32.8 mmHg. Aortic valve area is 1.2 cm2. Aortic valve peak velocity is 412 cm/s. There is moderate aortic stenosis. Mild to moderate aortic valve regurgitation. Mitral Valve No stenosis. Mitral valve non-specific thickening. Moderate mitral annular calcification. Mild regurgitation. Tricuspid Valve No stenosis. Non-specific thickening. Tricuspid regurgitation is inadequate for estimation of right ventricular systolic pressure. There is no evidence of pulmonary hypertension. Pulmonic Valve Pulmonic valve not well visualized. No stenosis. Trace regurgitation.    Aorta Normal aortic root and ascending Statement Selected aorta. Mildly dilated sinuses of Valsalva; diameter is 4 cm. IMPRESSION & PLAN:  Mr. Jose Alberto Darling is 69 y.o. male with multiple medical problem    Aortic stenosis:  Patient was told in the past that he may have a possible aortic bicuspid valve. Echo with mild aortic stenosis in 2017. Repeat echo in February 2020 showed moderate aortic stenosis with mean gradient of 32  Echo in 02/2021 with moderate to severe aortic stenosis with mean gradient approximately 30 and velocity 4.1 m/s  Currently he does not have any symptoms to suggest angina, syncope or heart failure. He has no limitation of functional capacity. He is completely asymptomatic  Symptoms of aortic stenosis discussed with patient in detail again during this visit. Management strategy also discussed  Repeat echo in 3 months to check progression of aortic stenosis and to check aortic valve gradients    Hypertension:  /75. Lisinopril, hydrochlorothiazide and amlodipine. Salt restriction advised    Hyperlipidemia:  Currently on atorvastatin. Continue same    Importance of diet and exercise was discussed with patient. This plan was discussed with patient who is in agreement. Thank you for allowing me to participate in patient care. Please feel free to call me if you have any question or concern. Megan Schafer MD  Please note: This document has been produced using voice recognition software. Unrecognized errors in transcription may be present.

## 2024-12-21 NOTE — OB PROVIDER DELIVERY SUMMARY - NSPROVIDERDELIVERYNOTE_OBGYN_ALL_OB_FT
Primary LTCS for arrest of dilation and category 2 tracing. C/b chorioamnionitis. Vaccuum assisted.  viable male infant, vertex presentation, Apgars 7/8, cord gasses sent  Grossly normal fallopian tubes, uterus, and ovaries. TXA given prior to incision due to high hemorrhagic risk. Uterus atonic and IM Methergine, Hemabate given. Lomotil given. Patient to continue on Methergine series postoperatively.    EBL: 861  IVF: 1700  UOP: 250    Hysterotomy closed with Vicryl in 1 layer in running locked fashion  Additional sutures placed for hemostasis. Surgicel powder placed on hysterotomy, bladder, rectus muscles.  Fascia closed in running fashion with Vicryl.  Subcutaneous tissue closed in 1 layer in running fashion, with additional deep dermal sutures for reapproximation.  Skin closed in subcuticular fashion.  Aquacel dressing.    Dictation#:    Due to difficult delivery of infant, second attending Dr. Phillips called in to OR to assist with delivery.  Dr. Garcia present for entirety of delivery.  VÍCTOR Hayes, PGY-2 Primary LTCS for arrest of dilation and category 2 tracing. C/b chorioamnionitis. Vaccuum assisted.  viable male infant, vertex presentation, 3690g, Apgars 7/9, cord gasses sent  Grossly normal fallopian tubes, uterus, and ovaries. TXA given prior to incision due to high hemorrhagic risk. Uterus atonic and IM Methergine, Hemabate given. Lomotil given. Patient to continue on Methergine series postoperatively.    EBL: 861  IVF: 1700  UOP: 250    Hysterotomy closed with Vicryl in 1 layer in running locked fashion  Additional sutures placed for hemostasis. Surgicel powder placed on hysterotomy, bladder, rectus muscles.  Fascia closed in running fashion with Vicryl.  Subcutaneous tissue closed in 1 layer in running fashion, with additional deep dermal sutures for reapproximation.  Skin closed in subcuticular fashion.  Aquacel dressing.    Dictation#: [id number]    Due to difficult delivery of infant, second attending Dr. Phillips called in to OR to assist with delivery.  Dr. Garcia present for entirety of delivery.  VÍCTOR Hayes, PGY-2

## 2024-12-21 NOTE — DISCHARGE NOTE OB - PATIENT PORTAL LINK FT
You can access the FollowMyHealth Patient Portal offered by [facility] by registering at the following website: [email or web address]. By joining [facility]’s FollowMyHealth portal, you will also be able to view your health information using other applications (apps) compatible with our system.

## 2024-12-21 NOTE — OB RN DELIVERY SUMMARY - AS DELIV COMPLICATIONS OB
wrist abnormal fetal heart rate tracing/chorioamnionitis/maternal fever/prolonged rupture of membranes

## 2024-12-22 LAB
BASOPHILS # BLD AUTO: 0.03 K/UL — SIGNIFICANT CHANGE UP (ref 0–0.2)
BASOPHILS NFR BLD AUTO: 0.2 % — SIGNIFICANT CHANGE UP (ref 0–2)
EOSINOPHIL # BLD AUTO: 0.02 K/UL — SIGNIFICANT CHANGE UP (ref 0–0.5)
EOSINOPHIL NFR BLD AUTO: 0.1 % — SIGNIFICANT CHANGE UP (ref 0–6)
HCT VFR BLD CALC: 30.8 % — LOW (ref 34.5–45)
HGB BLD-MCNC: 10 G/DL — LOW (ref 11.5–15.5)
IANC: 11.8 K/UL — HIGH (ref 1.8–7.4)
IMM GRANULOCYTES NFR BLD AUTO: 0.6 % — SIGNIFICANT CHANGE UP (ref 0–0.9)
LYMPHOCYTES # BLD AUTO: 1.39 K/UL — SIGNIFICANT CHANGE UP (ref 1–3.3)
LYMPHOCYTES # BLD AUTO: 9.8 % — LOW (ref 13–44)
MCHC RBC-ENTMCNC: 30.4 PG — SIGNIFICANT CHANGE UP (ref 27–34)
MCHC RBC-ENTMCNC: 32.5 G/DL — SIGNIFICANT CHANGE UP (ref 32–36)
MCV RBC AUTO: 93.6 FL — SIGNIFICANT CHANGE UP (ref 80–100)
MONOCYTES # BLD AUTO: 0.9 K/UL — SIGNIFICANT CHANGE UP (ref 0–0.9)
MONOCYTES NFR BLD AUTO: 6.3 % — SIGNIFICANT CHANGE UP (ref 2–14)
NEUTROPHILS # BLD AUTO: 11.8 K/UL — HIGH (ref 1.8–7.4)
NEUTROPHILS NFR BLD AUTO: 83 % — HIGH (ref 43–77)
NRBC # BLD: 0 /100 WBCS — SIGNIFICANT CHANGE UP (ref 0–0)
NRBC # FLD: 0 K/UL — SIGNIFICANT CHANGE UP (ref 0–0)
PLATELET # BLD AUTO: 145 K/UL — LOW (ref 150–400)
RBC # BLD: 3.29 M/UL — LOW (ref 3.8–5.2)
RBC # FLD: 15.2 % — HIGH (ref 10.3–14.5)
WBC # BLD: 14.22 K/UL — HIGH (ref 3.8–10.5)
WBC # FLD AUTO: 14.22 K/UL — HIGH (ref 3.8–10.5)

## 2024-12-22 RX ORDER — SENNOSIDES 8.6 MG/1
2 TABLET, FILM COATED ORAL AT BEDTIME
Refills: 0 | Status: DISCONTINUED | OUTPATIENT
Start: 2024-12-22 | End: 2024-12-23

## 2024-12-22 RX ORDER — FERROUS SULFATE 325(65) MG
325 TABLET ORAL DAILY
Refills: 0 | Status: DISCONTINUED | OUTPATIENT
Start: 2024-12-22 | End: 2024-12-23

## 2024-12-22 RX ORDER — IBUPROFEN 200 MG
600 TABLET ORAL EVERY 6 HOURS
Refills: 0 | Status: DISCONTINUED | OUTPATIENT
Start: 2024-12-22 | End: 2024-12-23

## 2024-12-22 RX ORDER — PNV/FERROUS FUM/DOCUSATE/FOLIC 90-50-1MG
1 TABLET, EXTENDED RELEASE ORAL DAILY
Refills: 0 | Status: DISCONTINUED | OUTPATIENT
Start: 2024-12-22 | End: 2024-12-23

## 2024-12-22 RX ADMIN — KETOROLAC TROMETHAMINE 30 MILLIGRAM(S): 30 INJECTION INTRAMUSCULAR; INTRAVENOUS at 13:00

## 2024-12-22 RX ADMIN — KETOROLAC TROMETHAMINE 30 MILLIGRAM(S): 30 INJECTION INTRAMUSCULAR; INTRAVENOUS at 06:20

## 2024-12-22 RX ADMIN — METHYLERGONOVINE MALEATE 0.2 MILLIGRAM(S): 0.2 TABLET ORAL at 05:49

## 2024-12-22 RX ADMIN — ACETAMINOPHEN 975 MILLIGRAM(S): 80 SOLUTION/ DROPS ORAL at 01:22

## 2024-12-22 RX ADMIN — KETOROLAC TROMETHAMINE 30 MILLIGRAM(S): 30 INJECTION INTRAMUSCULAR; INTRAVENOUS at 12:00

## 2024-12-22 RX ADMIN — ACETAMINOPHEN 975 MILLIGRAM(S): 80 SOLUTION/ DROPS ORAL at 10:11

## 2024-12-22 RX ADMIN — HEPARIN SODIUM 5000 UNIT(S): 1000 INJECTION, SOLUTION INTRAVENOUS; SUBCUTANEOUS at 21:28

## 2024-12-22 RX ADMIN — PIPERACILLIN AND TAZOBACTAM 200 GRAM(S): 3; .375 INJECTION, POWDER, LYOPHILIZED, FOR SOLUTION INTRAVENOUS at 05:50

## 2024-12-22 RX ADMIN — Medication 600 MILLIGRAM(S): at 18:18

## 2024-12-22 RX ADMIN — ACETAMINOPHEN 975 MILLIGRAM(S): 80 SOLUTION/ DROPS ORAL at 02:00

## 2024-12-22 RX ADMIN — METHYLERGONOVINE MALEATE 0.2 MILLIGRAM(S): 0.2 TABLET ORAL at 01:23

## 2024-12-22 RX ADMIN — ACETAMINOPHEN 975 MILLIGRAM(S): 80 SOLUTION/ DROPS ORAL at 15:47

## 2024-12-22 RX ADMIN — METHYLERGONOVINE MALEATE 0.2 MILLIGRAM(S): 0.2 TABLET ORAL at 09:11

## 2024-12-22 RX ADMIN — METHYLERGONOVINE MALEATE 0.2 MILLIGRAM(S): 0.2 TABLET ORAL at 13:17

## 2024-12-22 RX ADMIN — HEPARIN SODIUM 5000 UNIT(S): 1000 INJECTION, SOLUTION INTRAVENOUS; SUBCUTANEOUS at 09:11

## 2024-12-22 RX ADMIN — ACETAMINOPHEN 975 MILLIGRAM(S): 80 SOLUTION/ DROPS ORAL at 09:11

## 2024-12-22 RX ADMIN — ACETAMINOPHEN 975 MILLIGRAM(S): 80 SOLUTION/ DROPS ORAL at 21:55

## 2024-12-22 RX ADMIN — ACETAMINOPHEN 975 MILLIGRAM(S): 80 SOLUTION/ DROPS ORAL at 16:47

## 2024-12-22 RX ADMIN — Medication 600 MILLIGRAM(S): at 19:19

## 2024-12-22 RX ADMIN — ACETAMINOPHEN 975 MILLIGRAM(S): 80 SOLUTION/ DROPS ORAL at 21:26

## 2024-12-22 RX ADMIN — ONDANSETRON 4 MILLIGRAM(S): 4 TABLET ORAL at 01:49

## 2024-12-22 RX ADMIN — KETOROLAC TROMETHAMINE 30 MILLIGRAM(S): 30 INJECTION INTRAMUSCULAR; INTRAVENOUS at 05:49

## 2024-12-22 RX ADMIN — SIMETHICONE 80 MILLIGRAM(S): 125 CAPSULE, LIQUID FILLED ORAL at 05:49

## 2024-12-22 NOTE — LACTATION INITIAL EVALUATION - LACTATION INTERVENTIONS
initiate/review safe skin-to-skin/initiate/review hand expression/initiate/review pumping guidelines and safe milk handling/reverse pressure softening/initiate/review techniques for position and latch/initiate/review supplementation plan due to medical indications/review techniques to increase milk supply/initiate/review breast massage/compression/reviewed components of an effective feeding and at least 8 effective feedings per day required/reviewed importance of monitoring infant diapers, the breastfeeding log, and minimum output each day/reviewed strategies to transition to breastfeeding only/reviewed benefits and recommendations for rooming in/reviewed feeding on demand/by cue at least 8 times a day/reviewed indications of inadequate milk transfer that would require supplementation

## 2024-12-22 NOTE — PROVIDER CONTACT NOTE (OTHER) - ASSESSMENT
Alert and oriented. Passed orthostatic VS. Able to ambulate from bed to bathroom. Shaky when standing. Zofran given for nausea/vomitting Nsaids Pregnancy And Lactation Text: These medications are considered safe up to 30 weeks gestation. It is excreted in breast milk.

## 2024-12-22 NOTE — LACTATION INITIAL EVALUATION - INTERVENTION OUTCOME
Utilized Guide to Postpartum and [place] [facility] book as a visual reference for mom to better understand teaching points and to utilize at home  to review the education presented during hospitalization. Instructed in hand expression with good return demonstration. + colostrum noted. Encouraged to breastfeed the baby on demand based on cues and at least 8-12 times in a day. Instructed to log feedings along with wet and dirty diapers. Mother has been feeding formula,  not latching and over 24 hours old. Triple feeding plan reviewed with patient verbally and written. Indicates understanding of teaching. Pumping guidelines reviewed. Encouraged to call for assistance as needed. Lactation to follow up./verbalizes understanding/demonstrates understanding of teaching/good return demonstration/needs met/Lactation team to follow up

## 2024-12-22 NOTE — PROVIDER CONTACT NOTE (OTHER) - ACTION/TREATMENT ORDERED:
MD Thompson made aware of patient's symptoms. Told to give zofran and encourage patient to increase PO hydration.

## 2024-12-22 NOTE — PROGRESS NOTE ADULT - SUBJECTIVE AND OBJECTIVE BOX
OB Progress Note: pTLCS, POD#1    S: 35yo  POD#1 s/p pLTCS for category 2 tracing. Pregnancy and labor course c/b GDMA1 (fingersticks wnl) and chorioamnionitis. Zosyn to be dc'd today at 12pm. Continue with Methergine series. Pain is well controlled. She is tolerating a regular diet and passing flatus. She is voiding spontaneously, and ambulating without difficulty. Denies CP/SOB. Denies lightheadedness/dizziness. Denies N/V. Breastfeeding and bottle feeding.    O:  Vitals:  Vital Signs Last 24 Hrs  T(C): 36.6 (22 Dec 2024 06:23), Max: 38.1 (21 Dec 2024 12:01)  T(F): 97.9 (22 Dec 2024 06:23), Max: 100.58 (21 Dec 2024 12:01)  HR: 98 (22 Dec 2024 06:23) (63 - 138)  BP: 102/57 (22 Dec 2024 06:23) (88/54 - 127/62)  BP(mean): 76 (21 Dec 2024 18:00) (63 - 87)  RR: 18 (22 Dec 2024 06:23) (15 - 22)  SpO2: 97% (22 Dec 2024 06:23) (77% - 100%)    Parameters below as of 22 Dec 2024 06:23  Patient On (Oxygen Delivery Method): room air        MEDICATIONS  (STANDING):  acetaminophen     Tablet .. 975 milliGRAM(s) Oral <User Schedule>  citric acid/sodium citrate Solution 30 milliLiter(s) Oral once  dextrose 5% + lactated ringers. 1000 milliLiter(s) (125 mL/Hr) IV Continuous <Continuous>  diphtheria/tetanus/pertussis (acellular) Vaccine (Adacel) 0.5 milliLiter(s) IntraMuscular once  heparin   Injectable 5000 Unit(s) SubCutaneous every 12 hours  ibuprofen  Tablet. 600 milliGRAM(s) Oral every 6 hours  ketorolac   Injectable 30 milliGRAM(s) IV Push every 6 hours  lactated ringers. 1000 milliLiter(s) (125 mL/Hr) IV Continuous <Continuous>  methylergonovine 0.2 milliGRAM(s) Oral every 4 hours  morphine PF Epidural 2 milliGRAM(s) Epidural once  oxytocin Infusion 42 milliUNIT(s)/Min (42 mL/Hr) IV Continuous <Continuous>  oxytocin Infusion 167 milliUNIT(s)/Min (167 mL/Hr) IV Continuous <Continuous>  oxytocin Infusion. 2 milliUNIT(s)/Min (2 mL/Hr) IV Continuous <Continuous>  piperacillin/tazobactam IVPB.. 4.5 Gram(s) IV Intermittent every 8 hours      MEDICATIONS  (PRN):  dexAMETHasone  Injectable 4 milliGRAM(s) IV Push every 6 hours PRN Nausea  diphenhydrAMINE 25 milliGRAM(s) Oral every 6 hours PRN Pruritus  lanolin Ointment 1 Application(s) Topical every 6 hours PRN Sore Nipples  magnesium hydroxide Suspension 30 milliLiter(s) Oral two times a day PRN Constipation  naloxone Injectable 0.1 milliGRAM(s) IV Push every 3 minutes PRN For ANY of the following changes in patient status:  A. Breaths Per Minute LESS THAN 10, B. Oxygen saturation LESS THAN 90%, C. Sedation score of 6 for Stop After: 4 Times  ondansetron Injectable 4 milliGRAM(s) IV Push every 6 hours PRN Nausea  oxyCODONE    IR 5 milliGRAM(s) Oral every 3 hours PRN Mild Pain (1 - 3)  oxyCODONE    IR 5 milliGRAM(s) Oral every 3 hours PRN Moderate to Severe Pain (4-10)  oxyCODONE    IR 5 milliGRAM(s) Oral once PRN Moderate to Severe Pain (4-10)  simethicone 80 milliGRAM(s) Chew every 4 hours PRN Gas      Labs:  Blood type: A Negative  Rubella IgG: RPR: Negative                          10.0[L]   14.22[H] >-----------< 145[L]    (  @ 05:29 )             30.8[L]                        10.8[L]   13.15[H] >-----------< 159    (  @ 11:40 )             32.4[L]                        11.1[L]   9.39 >-----------< 183    (  @ 21:50 )             34.8    24 @ 11:40      134[L]  |  105  |  6[L]  ----------------------------<  92  4.4   |  14[L]  |  0.69        Ca    8.3[L]      21 Dec 2024 11:40            PE:  General: NAD  Abdomen: Soft, appropriately tender, incision c/d/i. Aquacel dressing in place, to be removed POD #7 in OB office  Extremities: No erythema, no pitting edema    A/P: 35yo  POD#1 s/p pLTCS for category 2 tracing.  Patient is stable and doing well post-operatively.    - Continue regular diet.  - Increase ambulation.  - Continue motrin, tylenol, oxycodone PRN for pain control  - Methergine series.  - zosyn to be discontinued 12pm today     Silver Hayden PA-C

## 2024-12-23 VITALS
DIASTOLIC BLOOD PRESSURE: 64 MMHG | SYSTOLIC BLOOD PRESSURE: 116 MMHG | HEART RATE: 94 BPM | OXYGEN SATURATION: 100 % | TEMPERATURE: 98 F | RESPIRATION RATE: 17 BRPM

## 2024-12-23 RX ORDER — ACETAMINOPHEN 80 MG/.8ML
3 SOLUTION/ DROPS ORAL
Qty: 0 | Refills: 0 | DISCHARGE
Start: 2024-12-23

## 2024-12-23 RX ORDER — PNV/FERROUS FUM/DOCUSATE/FOLIC 90-50-1MG
1 TABLET, EXTENDED RELEASE ORAL
Qty: 0 | Refills: 0 | DISCHARGE
Start: 2024-12-23

## 2024-12-23 RX ORDER — FERROUS SULFATE 325(65) MG
1 TABLET ORAL
Qty: 0 | Refills: 0 | DISCHARGE
Start: 2024-12-23

## 2024-12-23 RX ORDER — IBUPROFEN 200 MG
1 TABLET ORAL
Qty: 0 | Refills: 0 | DISCHARGE
Start: 2024-12-23

## 2024-12-23 RX ADMIN — ACETAMINOPHEN 975 MILLIGRAM(S): 80 SOLUTION/ DROPS ORAL at 09:55

## 2024-12-23 RX ADMIN — Medication 600 MILLIGRAM(S): at 01:05

## 2024-12-23 RX ADMIN — Medication 600 MILLIGRAM(S): at 00:23

## 2024-12-23 RX ADMIN — ACETAMINOPHEN 975 MILLIGRAM(S): 80 SOLUTION/ DROPS ORAL at 02:31

## 2024-12-23 RX ADMIN — ACETAMINOPHEN 975 MILLIGRAM(S): 80 SOLUTION/ DROPS ORAL at 08:55

## 2024-12-23 RX ADMIN — Medication 1 TABLET(S): at 11:57

## 2024-12-23 RX ADMIN — Medication 600 MILLIGRAM(S): at 11:57

## 2024-12-23 RX ADMIN — Medication 600 MILLIGRAM(S): at 06:10

## 2024-12-23 RX ADMIN — Medication 600 MILLIGRAM(S): at 05:14

## 2024-12-23 RX ADMIN — Medication 600 MILLIGRAM(S): at 12:57

## 2024-12-23 RX ADMIN — ACETAMINOPHEN 975 MILLIGRAM(S): 80 SOLUTION/ DROPS ORAL at 03:05

## 2024-12-23 RX ADMIN — Medication 325 MILLIGRAM(S): at 11:57

## 2024-12-23 RX ADMIN — HEPARIN SODIUM 5000 UNIT(S): 1000 INJECTION, SOLUTION INTRAVENOUS; SUBCUTANEOUS at 08:55

## 2024-12-23 NOTE — PROGRESS NOTE ADULT - ASSESSMENT
Patient seen at bedside resting comfortably offers no new complaints. + Ambulation, + void without difficulty, + flatus;  no bm; tolerating regular diet. both breastfeeding and bottle feeding. Denies HA, blurry vision or epigastric pain, CP, SOB, N/V/D,  dizziness, palpitations, worsening vaginal bleeding.    Vital Signs Last 24 Hrs  T(C): 37 (23 Dec 2024 09:58), Max: 37 (23 Dec 2024 09:58)  T(F): 98.6 (23 Dec 2024 09:58), Max: 98.6 (23 Dec 2024 09:58)  HR: 97 (23 Dec 2024 09:58) (88 - 97)  BP: 113/66 (23 Dec 2024 09:58) (99/58 - 131/62)  BP(mean): --  RR: 17 (23 Dec 2024 09:58) (16 - 18)  SpO2: 99% (23 Dec 2024 09:58) (97% - 99%)    Parameters below as of 23 Dec 2024 09:58  Patient On (Oxygen Delivery Method): room air        Gen: A&O x 3, NAD  Chest: CTABL  Cardiac: S1, S2, RRR  Breast: Soft, nontender, nonengorged  Abdomen: +BS; soft; Nontender, nondistended, Incision C/D/I steri strips in place   Gyn: Minimal lochia  Extremities: Nontender, DTRS 2+, no worsening edema                          10.0   14.22 )-----------( 145      ( 22 Dec 2024 05:29 )             30.8       A/P: 34 yr old F POD #2 s/p p/c/s for arrest of dilation (5cm) and CAT II c/b Chorio     #Chorio   - Met criteria via maternal temp 38.1 and fetal tachycardia   - S/P Zosyn x 24hrs   - Pt is asymptomatic     #PP  - Meeting PP milestones   - Pain management as needed  - cont post op care  - OOB and ambulate  - Incision C/D/I- aquacel   - Discharge planning     -d/w dr Jamie Mullins NP

## 2025-01-13 LAB — SURGICAL PATHOLOGY STUDY: SIGNIFICANT CHANGE UP

## 2025-03-10 ENCOUNTER — INPATIENT (INPATIENT)
Facility: HOSPITAL | Age: 35
LOS: 2 days | Discharge: HOME | End: 2025-03-13
Attending: STUDENT IN AN ORGANIZED HEALTH CARE EDUCATION/TRAINING PROGRAM | Admitting: STUDENT IN AN ORGANIZED HEALTH CARE EDUCATION/TRAINING PROGRAM
Payer: COMMERCIAL

## 2025-03-10 VITALS
SYSTOLIC BLOOD PRESSURE: 113 MMHG | WEIGHT: 169.98 LBS | OXYGEN SATURATION: 98 % | TEMPERATURE: 98 F | DIASTOLIC BLOOD PRESSURE: 76 MMHG | HEART RATE: 96 BPM

## 2025-03-10 DIAGNOSIS — Z98.891 HISTORY OF UTERINE SCAR FROM PREVIOUS SURGERY: Chronic | ICD-10-CM

## 2025-03-10 PROCEDURE — 99285 EMERGENCY DEPT VISIT HI MDM: CPT

## 2025-03-10 RX ORDER — MAGNESIUM, ALUMINUM HYDROXIDE 200-200 MG
30 TABLET,CHEWABLE ORAL ONCE
Refills: 0 | Status: COMPLETED | OUTPATIENT
Start: 2025-03-10 | End: 2025-03-10

## 2025-03-10 RX ORDER — ACETAMINOPHEN 500 MG/5ML
1000 LIQUID (ML) ORAL ONCE
Refills: 0 | Status: COMPLETED | OUTPATIENT
Start: 2025-03-10 | End: 2025-03-10

## 2025-03-10 NOTE — ED PROVIDER NOTE - NSICDXFAMILYHX_GEN_ALL_CORE_FT
FAMILY HISTORY:  Mother  Still living? Unknown  FH: type 2 diabetes, Age at diagnosis: Age Unknown    Grandparent  Still living? Unknown  FH: heart disease, Age at diagnosis: Age Unknown

## 2025-03-10 NOTE — ED PROVIDER NOTE - CARE PLAN
1 Principal Discharge DX:	Choledocholithiasis  Secondary Diagnosis:	Transaminitis  Secondary Diagnosis:	Leukocytosis

## 2025-03-10 NOTE — ED PROVIDER NOTE - CLINICAL SUMMARY MEDICAL DECISION MAKING FREE TEXT BOX
35 Y/O F denies PMH or PSH states that she developed epigastric burning pain with nausea radiating up the chest at 7PM. Pt states she is not currently nauseous, was given zofran with EMS. Pt denies melena, hematochezia or hematemesis. Pt denies fever chills or nightsweats. Pt states that her last BM was earlier today. Pt denies any other sx or acute complaints. Will hydrate with IV fluids and provide Maalox for  acidity, obtain a CXR to r/o consolidation or free air (low clinical suspicion) and will obtain a CBC and CMP to eval for anemia or electrolyte disturbance. Will obtain a lipase to eval for pancreatitis. Will give IV tylenol for pain and hydrate with IV fluids in the interm. Will obtain a RUQ u/s to eval for acute eder which is of low clinical suspicion and obtain an EKG and troponin to eval for an atypical presentation of ACS which is of low clinical suspicion.

## 2025-03-10 NOTE — ED PROVIDER NOTE - OBJECTIVE STATEMENT
35 Y/O F denies PMH or PSH states that she developed epigastric burning pain with nausea radiating up the chest at 7PM. Pt states she is not currently nauseous, was given zofran with EMS. Pt denies melena, hematochezia or hematemesis. Pt denies fever chills or nightsweats. Pt states that her last BM was earlier today. Pt denies any other sx or acute complaints.

## 2025-03-10 NOTE — ED PROVIDER NOTE - NS ED ATTENDING NAME FT
How Severe Is Your Skin Lesion?: mild
Is This A New Presentation, Or A Follow-Up?: Skin Lesion
Neno Khan

## 2025-03-10 NOTE — ED ADULT TRIAGE NOTE - CHIEF COMPLAINT QUOTE
Pt c/o epigastric pain starting x 1 day with nausea, not relieved by OTC meds. Pt delivered in December no /GI complaints. Pt denies, chest pain or SOB. Denies Phx

## 2025-03-11 ENCOUNTER — TRANSCRIPTION ENCOUNTER (OUTPATIENT)
Age: 35
End: 2025-03-11

## 2025-03-11 ENCOUNTER — RESULT REVIEW (OUTPATIENT)
Age: 35
End: 2025-03-11

## 2025-03-11 DIAGNOSIS — K80.50 CALCULUS OF BILE DUCT WITHOUT CHOLANGITIS OR CHOLECYSTITIS WITHOUT OBSTRUCTION: ICD-10-CM

## 2025-03-11 LAB
ALBUMIN SERPL ELPH-MCNC: 4.4 G/DL — SIGNIFICANT CHANGE UP (ref 3.3–5)
ALP SERPL-CCNC: 164 U/L — HIGH (ref 40–120)
ALT FLD-CCNC: 194 U/L — HIGH (ref 4–33)
ANION GAP SERPL CALC-SCNC: 15 MMOL/L — HIGH (ref 7–14)
APTT BLD: 30 SEC — SIGNIFICANT CHANGE UP (ref 24.5–35.6)
AST SERPL-CCNC: 275 U/L — HIGH (ref 4–32)
BASOPHILS # BLD AUTO: 0.03 K/UL — SIGNIFICANT CHANGE UP (ref 0–0.2)
BASOPHILS NFR BLD AUTO: 0.2 % — SIGNIFICANT CHANGE UP (ref 0–2)
BILIRUB SERPL-MCNC: 1 MG/DL — SIGNIFICANT CHANGE UP (ref 0.2–1.2)
BLD GP AB SCN SERPL QL: NEGATIVE — SIGNIFICANT CHANGE UP
BUN SERPL-MCNC: 14 MG/DL — SIGNIFICANT CHANGE UP (ref 7–23)
CALCIUM SERPL-MCNC: 9.9 MG/DL — SIGNIFICANT CHANGE UP (ref 8.4–10.5)
CHLORIDE SERPL-SCNC: 103 MMOL/L — SIGNIFICANT CHANGE UP (ref 98–107)
CO2 SERPL-SCNC: 20 MMOL/L — LOW (ref 22–31)
CREAT SERPL-MCNC: 0.56 MG/DL — SIGNIFICANT CHANGE UP (ref 0.5–1.3)
EGFR: 121 ML/MIN/1.73M2 — SIGNIFICANT CHANGE UP
EGFR: 121 ML/MIN/1.73M2 — SIGNIFICANT CHANGE UP
EOSINOPHIL # BLD AUTO: 0.03 K/UL — SIGNIFICANT CHANGE UP (ref 0–0.5)
EOSINOPHIL NFR BLD AUTO: 0.2 % — SIGNIFICANT CHANGE UP (ref 0–6)
GLUCOSE SERPL-MCNC: 105 MG/DL — HIGH (ref 70–99)
HCG SERPL-ACNC: <1 MIU/ML — SIGNIFICANT CHANGE UP
HCT VFR BLD CALC: 37.7 % — SIGNIFICANT CHANGE UP (ref 34.5–45)
HGB BLD-MCNC: 12.9 G/DL — SIGNIFICANT CHANGE UP (ref 11.5–15.5)
IANC: 12.04 K/UL — HIGH (ref 1.8–7.4)
IMM GRANULOCYTES NFR BLD AUTO: 0.3 % — SIGNIFICANT CHANGE UP (ref 0–0.9)
INR BLD: 1 RATIO — SIGNIFICANT CHANGE UP (ref 0.85–1.16)
LIDOCAIN IGE QN: 58 U/L — SIGNIFICANT CHANGE UP (ref 7–60)
LYMPHOCYTES # BLD AUTO: 14.3 % — SIGNIFICANT CHANGE UP (ref 13–44)
LYMPHOCYTES # BLD AUTO: 2.14 K/UL — SIGNIFICANT CHANGE UP (ref 1–3.3)
MCHC RBC-ENTMCNC: 29.3 PG — SIGNIFICANT CHANGE UP (ref 27–34)
MCHC RBC-ENTMCNC: 34.2 G/DL — SIGNIFICANT CHANGE UP (ref 32–36)
MCV RBC AUTO: 85.7 FL — SIGNIFICANT CHANGE UP (ref 80–100)
MONOCYTES # BLD AUTO: 0.71 K/UL — SIGNIFICANT CHANGE UP (ref 0–0.9)
MONOCYTES NFR BLD AUTO: 4.7 % — SIGNIFICANT CHANGE UP (ref 2–14)
NEUTROPHILS # BLD AUTO: 12.04 K/UL — HIGH (ref 1.8–7.4)
NEUTROPHILS NFR BLD AUTO: 80.3 % — HIGH (ref 43–77)
NRBC # BLD AUTO: 0 K/UL — SIGNIFICANT CHANGE UP (ref 0–0)
NRBC # FLD: 0 K/UL — SIGNIFICANT CHANGE UP (ref 0–0)
NRBC BLD AUTO-RTO: 0 /100 WBCS — SIGNIFICANT CHANGE UP (ref 0–0)
PLATELET # BLD AUTO: 269 K/UL — SIGNIFICANT CHANGE UP (ref 150–400)
POTASSIUM SERPL-MCNC: 4.2 MMOL/L — SIGNIFICANT CHANGE UP (ref 3.5–5.3)
POTASSIUM SERPL-SCNC: 4.2 MMOL/L — SIGNIFICANT CHANGE UP (ref 3.5–5.3)
PROT SERPL-MCNC: 8.5 G/DL — HIGH (ref 6–8.3)
PROTHROM AB SERPL-ACNC: 11.9 SEC — SIGNIFICANT CHANGE UP (ref 9.9–13.4)
RBC # BLD: 4.4 M/UL — SIGNIFICANT CHANGE UP (ref 3.8–5.2)
RBC # FLD: 12.3 % — SIGNIFICANT CHANGE UP (ref 10.3–14.5)
RH IG SCN BLD-IMP: NEGATIVE — SIGNIFICANT CHANGE UP
SODIUM SERPL-SCNC: 138 MMOL/L — SIGNIFICANT CHANGE UP (ref 135–145)
TROPONIN T, HIGH SENSITIVITY RESULT: <6 NG/L — SIGNIFICANT CHANGE UP
WBC # BLD: 15 K/UL — HIGH (ref 3.8–10.5)
WBC # FLD AUTO: 15 K/UL — HIGH (ref 3.8–10.5)

## 2025-03-11 PROCEDURE — 99222 1ST HOSP IP/OBS MODERATE 55: CPT | Mod: 57,GC

## 2025-03-11 PROCEDURE — 71046 X-RAY EXAM CHEST 2 VIEWS: CPT | Mod: 26

## 2025-03-11 PROCEDURE — 76705 ECHO EXAM OF ABDOMEN: CPT | Mod: 26

## 2025-03-11 PROCEDURE — 99222 1ST HOSP IP/OBS MODERATE 55: CPT | Mod: GC

## 2025-03-11 PROCEDURE — 88304 TISSUE EXAM BY PATHOLOGIST: CPT | Mod: 26

## 2025-03-11 DEVICE — CATH REDDICK W/TROCAR 4FRX50CM: Type: IMPLANTABLE DEVICE | Status: FUNCTIONAL

## 2025-03-11 DEVICE — LIGATING CLIPS WECK HEMOLOK POLYMER LARGE (PURPLE) 6: Type: IMPLANTABLE DEVICE | Status: FUNCTIONAL

## 2025-03-11 DEVICE — CATH FOGARTY 4FR X 80CM: Type: IMPLANTABLE DEVICE | Status: FUNCTIONAL

## 2025-03-11 DEVICE — CLIP LIGATING VESOLOCK MED/LG GRN: Type: IMPLANTABLE DEVICE | Status: FUNCTIONAL

## 2025-03-11 RX ORDER — PIPERACILLIN-TAZO-DEXTROSE,ISO 3.375G/5
3.38 IV SOLUTION, PIGGYBACK PREMIX FROZEN(ML) INTRAVENOUS ONCE
Refills: 0 | Status: COMPLETED | OUTPATIENT
Start: 2025-03-11 | End: 2025-03-11

## 2025-03-11 RX ORDER — SODIUM CHLORIDE 9 G/1000ML
1000 INJECTION, SOLUTION INTRAVENOUS
Refills: 0 | Status: DISCONTINUED | OUTPATIENT
Start: 2025-03-11 | End: 2025-03-13

## 2025-03-11 RX ORDER — HYDROMORPHONE/SOD CHLOR,ISO/PF 2 MG/10 ML
0.5 SYRINGE (ML) INJECTION
Refills: 0 | Status: DISCONTINUED | OUTPATIENT
Start: 2025-03-11 | End: 2025-03-11

## 2025-03-11 RX ORDER — HYDROMORPHONE/SOD CHLOR,ISO/PF 2 MG/10 ML
1 SYRINGE (ML) INJECTION
Refills: 0 | Status: DISCONTINUED | OUTPATIENT
Start: 2025-03-11 | End: 2025-03-11

## 2025-03-11 RX ORDER — OXYCODONE HYDROCHLORIDE 30 MG/1
5 TABLET ORAL EVERY 4 HOURS
Refills: 0 | Status: DISCONTINUED | OUTPATIENT
Start: 2025-03-11 | End: 2025-03-13

## 2025-03-11 RX ORDER — ACETAMINOPHEN 500 MG/5ML
1000 LIQUID (ML) ORAL EVERY 6 HOURS
Refills: 0 | Status: DISCONTINUED | OUTPATIENT
Start: 2025-03-11 | End: 2025-03-13

## 2025-03-11 RX ORDER — ONDANSETRON HCL/PF 4 MG/2 ML
4 VIAL (ML) INJECTION ONCE
Refills: 0 | Status: COMPLETED | OUTPATIENT
Start: 2025-03-11 | End: 2025-03-11

## 2025-03-11 RX ORDER — PIPERACILLIN-TAZO-DEXTROSE,ISO 3.375G/5
3.38 IV SOLUTION, PIGGYBACK PREMIX FROZEN(ML) INTRAVENOUS EVERY 8 HOURS
Refills: 0 | Status: DISCONTINUED | OUTPATIENT
Start: 2025-03-11 | End: 2025-03-11

## 2025-03-11 RX ORDER — OXYCODONE HYDROCHLORIDE 30 MG/1
2.5 TABLET ORAL EVERY 4 HOURS
Refills: 0 | Status: DISCONTINUED | OUTPATIENT
Start: 2025-03-11 | End: 2025-03-13

## 2025-03-11 RX ORDER — SIMETHICONE 80 MG
80 TABLET,CHEWABLE ORAL ONCE
Refills: 0 | Status: COMPLETED | OUTPATIENT
Start: 2025-03-11 | End: 2025-03-11

## 2025-03-11 RX ADMIN — SODIUM CHLORIDE 110 MILLILITER(S): 9 INJECTION, SOLUTION INTRAVENOUS at 05:35

## 2025-03-11 RX ADMIN — Medication 1000 MILLILITER(S): at 00:19

## 2025-03-11 RX ADMIN — Medication 0.5 MILLIGRAM(S): at 18:32

## 2025-03-11 RX ADMIN — SODIUM CHLORIDE 110 MILLILITER(S): 9 INJECTION, SOLUTION INTRAVENOUS at 19:09

## 2025-03-11 RX ADMIN — Medication 20 MILLIGRAM(S): at 00:32

## 2025-03-11 RX ADMIN — Medication 4 MILLIGRAM(S): at 00:32

## 2025-03-11 RX ADMIN — Medication 200 GRAM(S): at 04:58

## 2025-03-11 RX ADMIN — Medication 80 MILLIGRAM(S): at 19:09

## 2025-03-11 RX ADMIN — Medication 30 MILLILITER(S): at 00:18

## 2025-03-11 RX ADMIN — Medication 25 GRAM(S): at 10:26

## 2025-03-11 RX ADMIN — Medication 400 MILLIGRAM(S): at 00:18

## 2025-03-11 RX ADMIN — Medication 1000 MILLIGRAM(S): at 23:48

## 2025-03-11 RX ADMIN — Medication 4 MILLIGRAM(S): at 18:55

## 2025-03-11 RX ADMIN — Medication 0.5 MILLIGRAM(S): at 19:00

## 2025-03-11 NOTE — H&P ADULT - HISTORY OF PRESENT ILLNESS
34F hx Pfannenstiel , pw abdominal pain. Patient reports sudden onset epigastric abdominal pain on 3/10 at 6pm. Pain was intense and radiated to her back; it subsequently resolved with analgesia. Denies previous episodes of this nature, though did have GERD during her pregnancy. Denies hx of liver disease, yellowing of skin, darkening of urine, or pale stools.    In ED, VSS. WBC 15. Tbili 1.0. Alk phos 164 AST//194. RUQ US w/ cholelithiasis with CBD dilation of 7mm and no secondary signs of acute cholecystitis.

## 2025-03-11 NOTE — ED ADULT NURSE NOTE - OBJECTIVE STATEMENT
kane RN Pt received to intake rm 10b  , awake and alert, A&OX4, ambulatory. C/o.  epigastric burning pain associated nausea around 7pm tonight. Pt denies any vomitting. Pt states pain sometimes radiates to her upper back which is more of an aching pain. Pt appears very uncomfortable.      Respirations even and unlabored. Denies CP, SOB,, HA, dizziness, palpitations, blurry vision. 20G IV placed to R AC, labs drawn and sent, meds given per MD orders. IV from MES on L hand removed.      . Bed in lowest position, call bell within reach. Safety maintained.

## 2025-03-11 NOTE — ED ADULT NURSE NOTE - DOES PATIENT HAVE ADVANCE DIRECTIVE
No
[FreeTextEntry1] : 85-year-old man with hip fracture and atrial fibrillation in November 2021, back in sinus.

## 2025-03-11 NOTE — H&P ADULT - NSHPPHYSICALEXAM_GEN_ALL_CORE
General: not acutely distressed  Neurological: AO X4  Respiratory: nonlabored respirations  Cardiac: pulse present  Abdominal: soft, nontender, nondistended, no rebound, no guarding, no palpable mass, well-healed Pfannenstiel incision  Extremities: warm

## 2025-03-11 NOTE — CONSULT NOTE ADULT - ASSESSMENT
34F hx Pfannenstiel  who is presenting with sudden abdominal pain in epigastric region. Her pain radiated to the back and was not associated with nausea and vomiting. She denies fevers, chills. Patient not on any AC. Reportedly first episode of this nature. US showing mildly dilated CBD to 7mm and normal bili (1). AST/ALT and ALP elevated.     #C/f choledocholithiasis    Recommendations:  - Please get MRCP  - Continue to trend CMP, CBC   - Eval for lap eder per surgery     Sofia Romero MD  Gastroenterology/Hepatology Fellow, PGY-5  Please contact via TEAMS    NON-URGENT CONSULTS:  Please email [email or web address]  [email or web address]

## 2025-03-11 NOTE — CONSULT NOTE ADULT - SUBJECTIVE AND OBJECTIVE BOX
Initial GI Consult    Patient is a 34y old  Female who presents with a chief complaint of Choledocholithiasis (11 Mar 2025 04:36)    HPI: 34F hx Pfannenstiel  who is presenting with sudden abdominal pain in epigastric region. Her pain radiated to the back and was not associated with nausea and vomiting. She denies fevers, chills. Patient not on any AC. Reportedly first episode of this nature. US showing mildly dilated CBD to 7mm and normal bili (1). AST/ALT and ALP elevated.     PAST MEDICAL & SURGICAL HISTORY:  Iron deficiency anemia      H/O  section        FAMILY HISTORY:  FH: type 2 diabetes (Mother)    FH: heart disease (Grandparent)        MEDS:  MEDICATIONS  (STANDING):  acetaminophen     Tablet .. 1000 milliGRAM(s) Oral every 6 hours  lactated ringers. 1000 milliLiter(s) (110 mL/Hr) IV Continuous <Continuous>  piperacillin/tazobactam IVPB.- 3.375 Gram(s) IV Intermittent once  piperacillin/tazobactam IVPB.. 3.375 Gram(s) IV Intermittent every 8 hours    MEDICATIONS  (PRN):  oxyCODONE    IR 2.5 milliGRAM(s) Oral every 4 hours PRN Moderate Pain (4 - 6)  oxyCODONE    IR 5 milliGRAM(s) Oral every 4 hours PRN Severe Pain (7 - 10)    Allergies    Watermelon (Unknown)  Drug Allergies Not Recorded    Intolerances          ROS: As per HPI    ______________________________________________________________________  PHYSICAL EXAM:  T(C): 36.7 (25 @ 06:04), Max: 36.7 (03-10-25 @ 20:52)  HR: 94 (25 @ 06:04)  BP: 112/74 (25 @ 06:04)  RR: 19 (25 @ 06:04)  SpO2: 97% (25 @ 06:04)  Wt(kg): --      GEN: NAD, normocephalic  CVS: S1S2+  CHEST: clear to auscultation  ABD: soft , nontender, nondistended, bowel sounds present  EXTR: no cyanosis, no clubbing, no edema  NEURO: A&OX3  SKIN:  warm;  non icteric    ______________________________________________________________________  LABS:                        12.9   15.00 )-----------( 269      ( 11 Mar 2025 00:38 )             37.7     03-11    138  |  103  |  14  ----------------------------<  105[H]  4.2   |  20[L]  |  0.56    Ca    9.9      11 Mar 2025 00:38    TPro  8.5[H]  /  Alb  4.4  /  TBili  1.0  /  DBili  x   /  AST  275[H]  /  ALT  194[H]  /  AlkPhos  164[H]  03-11    LIVER FUNCTIONS - ( 11 Mar 2025 00:38 )  Alb: 4.4 g/dL / Pro: 8.5 g/dL / ALK PHOS: 164 U/L / ALT: 194 U/L / AST: 275 U/L / GGT: x           PT/INR - ( 11 Mar 2025 03:56 )   PT: 11.9 sec;   INR: 1.00 ratio         PTT - ( 11 Mar 2025 03:56 )  PTT:30.0 sec  ____________________________________________

## 2025-03-11 NOTE — BRIEF OPERATIVE NOTE - OPERATION/FINDINGS
robot assisted cholecystectomy, intraoperative cholangiogram w/ choledocho, passage of fogartey catheter however still choledocholithiasis, hemostasis at end of case

## 2025-03-11 NOTE — PATIENT PROFILE ADULT - FALL HARM RISK - FALL HARM RISK
----- Message from Chirag Brooke MD sent at 7/18/2017 10:12 PM CDT -----  Bone density has improved in some areas since last study and is currently in osteopenia range. Please ask if she is still taking fosamax.  
Spoke with  Nas and he states that Tiffanie is not taking Fosamax and is not taking anything for her bones.  will be at home still to discuss further options once received.  
Spoke with Tiffanie but she does not understand english well and will have her  Nas return call when he gets home so that we can relay message below to him so he can translate better for Tiffanie. Will await Gareth call to give message below.  
Other

## 2025-03-11 NOTE — BRIEF OPERATIVE NOTE - NSICDXBRIEFPROCEDURE_GEN_ALL_CORE_FT
PROCEDURES:  Robot-assisted laparoscopic cholecystectomy, with, if indicated, exploration of common duct or cholangiography or both 12-Mar-2025 05:16:28  Guzman Johnson

## 2025-03-11 NOTE — H&P ADULT - ASSESSMENT
34F pw epigastric abdominal pain with cholelithiasis, CBD dilation, and transaminitis, cf choledocholithiasis.     Plan:  -Admit to B Team, Dr. Joel.  -NPO/IVF.  -GI consult placed.  -Chemical VTE ppx held pending GI recs.  -Abx.  -Will ultimately require cholecystectomy, however patient states she is unsure if she would be willing to undergo surgery on this admission.    Discussed with Dr. Joel.    B Team Surgery  Please page [phone] for all questions.

## 2025-03-11 NOTE — BRIEF OPERATIVE NOTE - COMMENTS
I, Nori Neff PA-C, served as the first assistant in this operation. I assisted in placing ports, docking, and targeting the da Madelin robot, first assisted at the surgical field while the surgeon was performing the operation at the robotic console by providing instrument exchanges, tissue retraction, suction and irrigation, specimen retrieval, passing and removing sutures and sponges, undocking the robotic platform, and closed surgical wounds.     Please match surgeon's billing codes

## 2025-03-11 NOTE — H&P ADULT - NSHPLABSRESULTS_GEN_ALL_CORE
< from: US Abdomen Upper Quadrant Right (03.11.25 @ 01:52) >      ACC: [id number] EXAM:  US ABDOMEN RT UPR QUADRANT   ORDERED BY: QIAN HOPKINS     PROCEDURE DATE:  03/11/2025          INTERPRETATION:  CLINICAL INFORMATION: Right upper quadrant pain. Nausea.    COMPARISON: None available.    TECHNIQUE: Sonography of the right upper quadrant.    FINDINGS:  Liver: Within normal limits.  Bile ducts: Normal caliber. Common bile duct measures 7 mm.  Gallbladder: Nondilated gallbladder with numerous mobile gallbladder   stones. No wall thickening or pericholecystic fluid. Negative sonographic   Rodriguez sign, however, patient was premedicated prior to exam.  Pancreas: Visualized portions are within normal limits.  Right kidney: 12.2 cm. No hydronephrosis.  Ascites: None.  IVC: Visualized portions are within normallimits.    IMPRESSION:  Cholelithiasis. No wall thickening or pericholecystic fluid. Mildly   dilated common bile duct up to 7 mm.          --- End of Report ---          THOMAS TERRELL MD; Resident Radiologist  This document has been electronically signed.  VINNIE MONTANO MD; Attending Radiologist  This document has been electronically signed. Mar 11 2025  3:15AM    < end of copied text >

## 2025-03-11 NOTE — H&P ADULT - NSICDXPASTMEDICALHX_GEN_ALL_CORE_FT
Addended by: HANS HARLEY on: 5/20/2019 03:51 PM     Modules accepted: Orders     PAST MEDICAL HISTORY:  Iron deficiency anemia

## 2025-03-11 NOTE — ED ADULT NURSE REASSESSMENT NOTE - NS ED NURSE REASSESS COMMENT FT1
Patient received into my care alert and oriented x 4, IV infusing at 110 cc/hr on the left AC g 20. Vital sign stable. No verbal complaints at this time. Family at bedside. Bed in lowest position, side rails up for safety. Afebrile, remains NPO. Surgical resident in to see patient.

## 2025-03-11 NOTE — ED ADULT NURSE REASSESSMENT NOTE - NS ED NURSE REASSESS COMMENT FT1
Pt appears comfortable on stretcher, no signs of distress or discomfort. Denies pain, CP, SOB, fever, chills, n/v, abd. pain at this time. Resp. equal and unlabored. LR infusing 110cc/hr. VS stable. Safety precautions maintained. Pending bed assignment.

## 2025-03-11 NOTE — ED ADULT NURSE NOTE - SUICIDE SCREENING QUESTION 2
Narrow Angle Glaucoma Operative Note     Patient Name: Sol Paul  MRN: [id number]  : 1954    Date of Procedure: 3/18/2024      POSTOPERATIVE DIAGNOSIS:  Glaucoma, narrow Open angle.    PROCEDURE PERFORMED: Bilateral YAG laser Iridotomies      ANESTHESIA:  Topical .    COMPLICATIONS:  None.    ASSISTANTS: NONE    BLOOD ADMINISTERED: NONE    ESTIMATED BLOOD LOSS: NONE    SPECIMENS REMOVED: NONE      DESCRIPTION OF PROCEDURE:  The patient was brought to the laser.  Patient had tetracaine placed in both eyes.  A Peripheral iridotomy lens was placed on the eye.  The superior iris was lasered, bilaterally .   Total shots fired Right 150 , Power setting 6.8millijoules; left 134 shots, 6.8milliwatts.  Thick irises, unsure if openings completed  . Patient tolerated procedure without complications.  Followup 2-3 weeks.      Fred Dyer M.D.  
No

## 2025-03-11 NOTE — CONSULT NOTE ADULT - ATTENDING COMMENTS
Agree with above  # Abdominal Pain with elevated liver enzymes    Planned for cholecystectomy. Recommend IOC during

## 2025-03-12 LAB
ALBUMIN SERPL ELPH-MCNC: 3.7 G/DL — SIGNIFICANT CHANGE UP (ref 3.3–5)
ALP SERPL-CCNC: 129 U/L — HIGH (ref 40–120)
ALT FLD-CCNC: 198 U/L — HIGH (ref 4–33)
ANION GAP SERPL CALC-SCNC: 14 MMOL/L — SIGNIFICANT CHANGE UP (ref 7–14)
AST SERPL-CCNC: 99 U/L — HIGH (ref 4–32)
BILIRUB DIRECT SERPL-MCNC: <0.2 MG/DL — SIGNIFICANT CHANGE UP (ref 0–0.3)
BILIRUB INDIRECT FLD-MCNC: >0.4 MG/DL — SIGNIFICANT CHANGE UP (ref 0–1)
BILIRUB SERPL-MCNC: 0.6 MG/DL — SIGNIFICANT CHANGE UP (ref 0.2–1.2)
BUN SERPL-MCNC: 9 MG/DL — SIGNIFICANT CHANGE UP (ref 7–23)
CALCIUM SERPL-MCNC: 9 MG/DL — SIGNIFICANT CHANGE UP (ref 8.4–10.5)
CHLORIDE SERPL-SCNC: 103 MMOL/L — SIGNIFICANT CHANGE UP (ref 98–107)
CO2 SERPL-SCNC: 21 MMOL/L — LOW (ref 22–31)
CREAT SERPL-MCNC: 0.49 MG/DL — LOW (ref 0.5–1.3)
EGFR: 125 ML/MIN/1.73M2 — SIGNIFICANT CHANGE UP
EGFR: 125 ML/MIN/1.73M2 — SIGNIFICANT CHANGE UP
GLUCOSE SERPL-MCNC: 97 MG/DL — SIGNIFICANT CHANGE UP (ref 70–99)
HCT VFR BLD CALC: 33.9 % — LOW (ref 34.5–45)
HGB BLD-MCNC: 11.3 G/DL — LOW (ref 11.5–15.5)
MAGNESIUM SERPL-MCNC: 2 MG/DL — SIGNIFICANT CHANGE UP (ref 1.6–2.6)
MCHC RBC-ENTMCNC: 29.2 PG — SIGNIFICANT CHANGE UP (ref 27–34)
MCHC RBC-ENTMCNC: 33.3 G/DL — SIGNIFICANT CHANGE UP (ref 32–36)
MCV RBC AUTO: 87.6 FL — SIGNIFICANT CHANGE UP (ref 80–100)
NRBC # BLD AUTO: 0 K/UL — SIGNIFICANT CHANGE UP (ref 0–0)
NRBC # FLD: 0 K/UL — SIGNIFICANT CHANGE UP (ref 0–0)
NRBC BLD AUTO-RTO: 0 /100 WBCS — SIGNIFICANT CHANGE UP (ref 0–0)
PHOSPHATE SERPL-MCNC: 4.2 MG/DL — SIGNIFICANT CHANGE UP (ref 2.5–4.5)
PLATELET # BLD AUTO: 233 K/UL — SIGNIFICANT CHANGE UP (ref 150–400)
POTASSIUM SERPL-MCNC: 3.9 MMOL/L — SIGNIFICANT CHANGE UP (ref 3.5–5.3)
POTASSIUM SERPL-SCNC: 3.9 MMOL/L — SIGNIFICANT CHANGE UP (ref 3.5–5.3)
PROT SERPL-MCNC: 6.8 G/DL — SIGNIFICANT CHANGE UP (ref 6–8.3)
RBC # BLD: 3.87 M/UL — SIGNIFICANT CHANGE UP (ref 3.8–5.2)
RBC # FLD: 12.5 % — SIGNIFICANT CHANGE UP (ref 10.3–14.5)
SODIUM SERPL-SCNC: 138 MMOL/L — SIGNIFICANT CHANGE UP (ref 135–145)
WBC # BLD: 8.73 K/UL — SIGNIFICANT CHANGE UP (ref 3.8–10.5)
WBC # FLD AUTO: 8.73 K/UL — SIGNIFICANT CHANGE UP (ref 3.8–10.5)

## 2025-03-12 PROCEDURE — 88305 TISSUE EXAM BY PATHOLOGIST: CPT | Mod: 26

## 2025-03-12 PROCEDURE — 88341 IMHCHEM/IMCYTCHM EA ADD ANTB: CPT | Mod: 26

## 2025-03-12 PROCEDURE — 43239 EGD BIOPSY SINGLE/MULTIPLE: CPT

## 2025-03-12 PROCEDURE — 43262 ENDO CHOLANGIOPANCREATOGRAPH: CPT

## 2025-03-12 PROCEDURE — 43264 ERCP REMOVE DUCT CALCULI: CPT

## 2025-03-12 PROCEDURE — 88342 IMHCHEM/IMCYTCHM 1ST ANTB: CPT | Mod: 26

## 2025-03-12 DEVICE — GWIRE JAGTOME REVOLUTION RX 260CM/0.025IN: Type: IMPLANTABLE DEVICE | Status: FUNCTIONAL

## 2025-03-12 DEVICE — CATH BLLN EXTRACT DIST GUIDE WIRE 15MM 3LUM: Type: IMPLANTABLE DEVICE | Status: FUNCTIONAL

## 2025-03-12 RX ADMIN — Medication 1000 MILLIGRAM(S): at 07:15

## 2025-03-12 RX ADMIN — Medication 1000 MILLIGRAM(S): at 00:03

## 2025-03-12 RX ADMIN — Medication 40 MILLIGRAM(S): at 19:37

## 2025-03-12 RX ADMIN — Medication 1000 MILLIGRAM(S): at 06:43

## 2025-03-12 RX ADMIN — Medication 1000 MILLIGRAM(S): at 19:36

## 2025-03-12 NOTE — CHART NOTE - NSCHARTNOTEFT_GEN_A_CORE
General Surgery Post op Check    Pt seen and examined without complaints. Pain is controlled. Denies SOB/CP/N/V.     Vital Signs Last 24 Hrs  T(C): 36.7 (12 Mar 2025 18:00), Max: 36.8 (12 Mar 2025 01:39)  T(F): 98 (12 Mar 2025 18:00), Max: 98.2 (12 Mar 2025 01:39)  HR: 67 (12 Mar 2025 18:00) (66 - 85)  BP: 115/72 (12 Mar 2025 18:00) (99/56 - 148/82)  BP(mean): 76 (11 Mar 2025 20:00) (68 - 76)  RR: 17 (12 Mar 2025 18:00) (13 - 19)  SpO2: 97% (12 Mar 2025 18:00) (93% - 100%)    Parameters below as of 12 Mar 2025 18:00  Patient On (Oxygen Delivery Method): room air        I&O's Summary    11 Mar 2025 07:01  -  12 Mar 2025 07:00  --------------------------------------------------------  IN: 1050 mL / OUT: 450 mL / NET: 600 mL    12 Mar 2025 07:01  -  12 Mar 2025 19:00  --------------------------------------------------------  IN: 440 mL / OUT: 0 mL / NET: 440 mL        Physical Exam  Gen: NAD, A&Ox3  Pulm: No respiratory distress, no subcostal retractions  CV: RRR, no JVD  Abd: Softly distended, NTTP, no rebound, no guarding, port sites incisions c/d/i.   Extremities:  FROM, warm and well perfused, equal bilateral muscle strength      A/P: 34y Female s/p laparoscopic cholecystectomy and IOC for acute cholecystitis and choledocholithiasis, 3/11 Now s/p ERCP, biliary sphincterotomy.     -DVT prophylaxis w/ SCD.   - Encouraged OOB  -Strict I&O's  -Analgesia and antiemetics as needed  - CLD  - Start omeprazole 40mg BID x 8 weeks  - Repeat EGD in 3 months for ulcer healing follow up.      B team   [id number]
General Surgery Post op Check    Pt seen and examined without complaints. Pain is controlled. Denies SOB/CP/N/V.     Vital Signs Last 24 Hrs  T(C): 36.6 (11 Mar 2025 20:56), Max: 36.9 (11 Mar 2025 08:58)  T(F): 97.9 (11 Mar 2025 20:56), Max: 98.5 (11 Mar 2025 08:58)  HR: 80 (11 Mar 2025 20:56) (71 - 104)  BP: 99/64 (11 Mar 2025 20:56) (99/64 - 128/68)  BP(mean): 76 (11 Mar 2025 20:00) (68 - 78)  RR: 18 (11 Mar 2025 20:56) (13 - 19)  SpO2: 96% (11 Mar 2025 20:56) (93% - 99%)    Parameters below as of 11 Mar 2025 20:56  Patient On (Oxygen Delivery Method): room air        I&O's Summary      Physical Exam  Gen: NAD, A&Ox3  Pulm: No respiratory distress, no subcostal retractions  CV: RRR, no JVD  Abd: Softly distended, NTTP, no rebound, no guarding, port sites incisions c/d/i.   Extremities:  FROM, warm and well perfused, equal bilateral muscle strength      A/P: 34y Female s/p laparoscopic cholecystectomy and IOC for acute cholecystitis and choledocholithiasis   -DVT prophylaxis w/ SCD  - Encouraged OOB  -Strict I&O's  -Analgesia and antiemetics as needed  - NPO for possible ERCP tomorrow   -Monitor overnight    B team   [id number]

## 2025-03-12 NOTE — PROGRESS NOTE ADULT - ATTENDING COMMENTS
If you have labs or imaging done, the results will automatically release in [employer] without an interpretation.  Your health care professional will review those results and send an interpretation with recommendations as soon as possible, but this may be 1-3 business days.    If you have any questions regarding your visit, please contact your care team.     [email or web address] Access Services: [phone]  [facility] CLINIC HOURS TELEPHONE NUMBER   Deidra Hammond, JAYSON Riggs-DEENA Gunn-DEENA Mar-Surgery Scheduler  Aditi-       Monday- [place]  8:00 am-4:00 pm    Tuesday- [place]  8:00 am-4:00 pm    Wednesday- [place] 8:00 am-4:00 pm    Thursday- [place] 8:00 am-4:00 pm    Friday- [place]  8:00 am-4:00 pm [facility]  [address]  [place] [address]  PH: [phone]  Fax: [phone]    Imaging Scheduling all locations  PH: [phone]     [facility]  [address] [place] [address] Dr.  [place], [place] [address] [phone]    [facility]  [address] [place] [address]  PH: [phone]     **Surgeries** Our Surgery Schedulers will contact you to schedule. If you do not receive a call within 3 business days, please call [phone].  Urgent Care locations:  [facility]       Monday-Friday   10 am - 8 pm    Saturday and Sunday   9 am - 5 pm   [phone]   If you need a medication refill, please contact your pharmacy. Please allow 3 business days for your refill to be completed.  As always, Thank you for trusting us with your healthcare needs!   Patient s/p robotic eder with positive ioc  plan  ercp today  reg diet after  d/c planning    I have personally interviewed and examined this patient, reviewed pertinent labs and imaging, and discussed the case with colleagues, residents, and physician assistants on B Team rounds.    The active care issues are:  1. choledocholithiasis    The Acute Care Surgery (B Team) Attending Group Practice    urgent issues - spectra [id number]  nonurgent issues - [phone]  patient appointments or afterhours - [phone]

## 2025-03-12 NOTE — PRE-OP CHECKLIST - BSA (M2)
1.82 Detail Level: Detailed Number Of Freeze-Thaw Cycles: 1 freeze-thaw cycle Render Post-Care Instructions In Note?: no Consent: The patient's consent was obtained including but not limited to risks of crusting, scabbing, blistering, scarring, darker or lighter pigmentary change, recurrence, incomplete removal and infection. Show Aperture Variable?: Yes Post-Care Instructions: I reviewed with the patient in detail post-care instructions. Patient is to wear sunprotection, and avoid picking at any of the treated lesions. Pt may apply Vaseline to crusted or scabbing areas. Duration Of Freeze Thaw-Cycle (Seconds): 7

## 2025-03-13 ENCOUNTER — TRANSCRIPTION ENCOUNTER (OUTPATIENT)
Age: 35
End: 2025-03-13

## 2025-03-13 VITALS
HEART RATE: 95 BPM | DIASTOLIC BLOOD PRESSURE: 70 MMHG | SYSTOLIC BLOOD PRESSURE: 111 MMHG | TEMPERATURE: 98 F | RESPIRATION RATE: 17 BRPM | OXYGEN SATURATION: 97 %

## 2025-03-13 LAB
ALBUMIN SERPL ELPH-MCNC: 3.5 G/DL — SIGNIFICANT CHANGE UP (ref 3.3–5)
ALP SERPL-CCNC: 98 U/L — SIGNIFICANT CHANGE UP (ref 40–120)
ALT FLD-CCNC: 128 U/L — HIGH (ref 4–33)
ANION GAP SERPL CALC-SCNC: 9 MMOL/L — SIGNIFICANT CHANGE UP (ref 7–14)
AST SERPL-CCNC: 39 U/L — HIGH (ref 4–32)
BILIRUB SERPL-MCNC: 0.4 MG/DL — SIGNIFICANT CHANGE UP (ref 0.2–1.2)
BUN SERPL-MCNC: 12 MG/DL — SIGNIFICANT CHANGE UP (ref 7–23)
CALCIUM SERPL-MCNC: 8.6 MG/DL — SIGNIFICANT CHANGE UP (ref 8.4–10.5)
CHLORIDE SERPL-SCNC: 106 MMOL/L — SIGNIFICANT CHANGE UP (ref 98–107)
CO2 SERPL-SCNC: 24 MMOL/L — SIGNIFICANT CHANGE UP (ref 22–31)
CREAT SERPL-MCNC: 0.66 MG/DL — SIGNIFICANT CHANGE UP (ref 0.5–1.3)
EGFR: 117 ML/MIN/1.73M2 — SIGNIFICANT CHANGE UP
EGFR: 117 ML/MIN/1.73M2 — SIGNIFICANT CHANGE UP
GLUCOSE SERPL-MCNC: 112 MG/DL — HIGH (ref 70–99)
HCT VFR BLD CALC: 29.5 % — LOW (ref 34.5–45)
HGB BLD-MCNC: 9.8 G/DL — LOW (ref 11.5–15.5)
MAGNESIUM SERPL-MCNC: 1.8 MG/DL — SIGNIFICANT CHANGE UP (ref 1.6–2.6)
MCHC RBC-ENTMCNC: 29 PG — SIGNIFICANT CHANGE UP (ref 27–34)
MCHC RBC-ENTMCNC: 33.2 G/DL — SIGNIFICANT CHANGE UP (ref 32–36)
MCV RBC AUTO: 87.3 FL — SIGNIFICANT CHANGE UP (ref 80–100)
NRBC # BLD AUTO: 0 K/UL — SIGNIFICANT CHANGE UP (ref 0–0)
NRBC # FLD: 0 K/UL — SIGNIFICANT CHANGE UP (ref 0–0)
NRBC BLD AUTO-RTO: 0 /100 WBCS — SIGNIFICANT CHANGE UP (ref 0–0)
PHOSPHATE SERPL-MCNC: 2.7 MG/DL — SIGNIFICANT CHANGE UP (ref 2.5–4.5)
PLATELET # BLD AUTO: 215 K/UL — SIGNIFICANT CHANGE UP (ref 150–400)
POTASSIUM SERPL-MCNC: 3.6 MMOL/L — SIGNIFICANT CHANGE UP (ref 3.5–5.3)
POTASSIUM SERPL-SCNC: 3.6 MMOL/L — SIGNIFICANT CHANGE UP (ref 3.5–5.3)
PROT SERPL-MCNC: 6.5 G/DL — SIGNIFICANT CHANGE UP (ref 6–8.3)
RBC # BLD: 3.38 M/UL — LOW (ref 3.8–5.2)
RBC # FLD: 12.5 % — SIGNIFICANT CHANGE UP (ref 10.3–14.5)
SODIUM SERPL-SCNC: 139 MMOL/L — SIGNIFICANT CHANGE UP (ref 135–145)
WBC # BLD: 7.67 K/UL — SIGNIFICANT CHANGE UP (ref 3.8–10.5)
WBC # FLD AUTO: 7.67 K/UL — SIGNIFICANT CHANGE UP (ref 3.8–10.5)

## 2025-03-13 RX ORDER — OMEPRAZOLE 20 MG/1
1 CAPSULE, DELAYED RELEASE ORAL
Qty: 112 | Refills: 0
Start: 2025-03-13 | End: 2025-05-07

## 2025-03-13 RX ORDER — ACETAMINOPHEN 500 MG/5ML
2 LIQUID (ML) ORAL
Qty: 0 | Refills: 0 | DISCHARGE
Start: 2025-03-13

## 2025-03-13 RX ORDER — OXYCODONE HYDROCHLORIDE 30 MG/1
1 TABLET ORAL
Qty: 5 | Refills: 0
Start: 2025-03-13

## 2025-03-13 RX ADMIN — Medication 1000 MILLIGRAM(S): at 05:34

## 2025-03-13 RX ADMIN — Medication 40 MILLIGRAM(S): at 05:34

## 2025-03-13 NOTE — DISCHARGE NOTE NURSING/CASE MANAGEMENT/SOCIAL WORK - HAS THE PATIENT RECEIVED THE INFLUENZA VACCINE THIS SEASON?
yes... Additional Notes: Patient not of age for pneumonia vaccine. Quality 130: Documentation Of Current Medications In The Medical Record: Current Medications Documented Detail Level: Detailed

## 2025-03-13 NOTE — DISCHARGE NOTE PROVIDER - CARE PROVIDER_API CALL
Marcel Banerjee  Surgical Critical Care  [address] [place] [id number]  Phone: [phone]  Fax: [phone]  Follow Up Time: 2 weeks    Zoe Duque  Gastroenterology  [address] [place] [id number]  Phone: [phone]  Fax: [phone]  Follow Up Time:

## 2025-03-13 NOTE — DISCHARGE NOTE NURSING/CASE MANAGEMENT/SOCIAL WORK - NSDCPEPT PROEDMA_GEN_ALL_CORE
Pt arrives to ED with family for concerns of dizziness and nausea since waking up with morning. Pt reports having a valve replaced 4 weeks ago. Pt's family called [facility]'s this morning and was recommended to come here for evaluation.
Yes

## 2025-03-13 NOTE — DISCHARGE NOTE PROVIDER - HOSPITAL COURSE
34y Female s/p laparoscopic cholecystectomy and IOC for acute cholecystitis and choledocholithiasis, 3/11. POD 1 she went with GI and is s/p ERCP, biliary sphincterotomy. Patient tolerated the procedure well, without complication. Patient remained hemodynamically stable in the PACU and transferred to the surgical floor. Diet was restarted and advanced as tolerated. Pain control was transitioned from IV to PO pain meds. At this time, patient is currently ambulating, voiding, tolerating a regular diet. Pain well controlled on PO pain meds. Patient has been deemed stable for discharge home with follow up as an outpatient.

## 2025-03-13 NOTE — DISCHARGE NOTE PROVIDER - NSDCMRMEDTOKEN_GEN_ALL_CORE_FT
acetaminophen 500 mg oral tablet: 2 tab(s) orally every 6 hours as needed for  mild pain  omeprazole 40 mg oral delayed release capsule: 1 cap(s) orally 2 times a day  oxyCODONE 5 mg oral tablet: 1 tab(s) orally every 6 hours as needed for  severe pain MDD: 4

## 2025-03-13 NOTE — DISCHARGE NOTE NURSING/CASE MANAGEMENT/SOCIAL WORK - NSDCPETBCESMAN_GEN_ALL_CORE
Hemoglobin A1C (%)   Date Value   12/23/2019 7.2 (H)       Work stress/work-life balance stress 8.5/10.  Compared to being off work:  4/10.    Working 5 hrs/day is helping to reduce stress.      +regular ex:  Walking.  Eating healthier.    HISTORY OF PRESENT ILLNESS:  Kamila is a 40 year old female who presents for:  1. Type 2 diabetes mellitus without complication, without long-term current use of insulin (CMS/[facility])    2. Essential hypertension    3. BMI 36.0-36.9,adult    4. Nutritional counseling    5. Medication management    6. Adjustment disorder with other symptom    7. Hyperlipidemia, unspecified hyperlipidemia type        She is taking her medications without any problems.  She has not had any endocrine concerns.  She has not had any cardiovascular or pulmonary symptoms.      Past Medical History:   Diagnosis Date   • AR (allergic rhinitis) 6/4/2014   • ASCUS on Pap smear     neg HPV: repeat 2015.    • Atypical chest pain    • CHRISTINA III (cervical intraepithelial neoplasia grade III) with severe dysplasia s/p LEEP 2013: pap q 6 months 6/4/2014    h/o leep for CHRISTINA 3;   • Encounter for birth control pills maintenance 4/5/2013   • Excessive bleeding in premenopausal period/fibroids: gyne 3/8/2016   • Hypertension    • Mammogram abnormal 02/06/2018    needs short interval f/u mammo due 8/2018   • Need for prophylactic vaccination with combined diphtheria-tetanus-pertussis (DTP) vaccine    • Prediabetes    • Seasonal allergies 11/3/2011   • Type 2 diabetes mellitus (CMS/[facility])    • Uterine fibroids: uterus 12 cm: gyne ref 3/8/2016   • Vitamin D deficiency    • Yeast vaginitis, recurrent 6/4/2014       SOCIAL HISTORY:   Social History     Tobacco Use   • Smoking status: Never Smoker   • Smokeless tobacco: Never Used   Substance Use Topics   • Alcohol use: No     Alcohol/week: 0.0 standard drinks     Frequency: 2-4 times a month     Drinks per session: 1 or 2     Binge frequency: Never       ALLERGIES:  No Known  Allergies    Current Outpatient Medications   Medication Sig   • fluconazole (DIFLUCAN) 150 MG tablet Take 1 tablet by mouth as directed. Take 1 tab now.  Take 2nd tab 72 hours after first dose.   • amLODIPine (NORVASC) 5 MG tablet Take 1 tablet by mouth daily.   • metFORMIN (GLUCOPHAGE-XR) 500 MG 24 hr tablet 2 po qam with breakfast.   • atorvastatin (LIPITOR) 20 MG tablet Take 1 tablet by mouth daily.   • ONETOUCH VERIO test strip Test blood sugar 3 times per week as directed. Diagnosis: E11.9. Meter: OT verioflex   • ONETOUCH DELICA LANCETS 33G Misc Use to monitor blood sugars 3 times per week   • ibuprofen (MOTRIN) 800 MG tablet Take 1 tablet by mouth every 8 hours as needed for Pain.   • lidocaine (LIDODERM) 5 % patch Place 3 patches onto the skin every 24 hours. Remove patch 12 hours after applying   • cyclobenzaprine (FLEXERIL) 10 MG tablet Take 1 tablet by mouth 2 times daily as needed for Muscle spasms.   • docusate sodium (COLACE) 100 MG capsule Take 1 capsule by mouth 2 times daily.   • Cholecalciferol (VITAMIN D) 2000 UNITS tablet Take 2,000 Units by mouth daily.     No current facility-administered medications for this visit.        PHYSICAL EXAMINATION:  VITAL SIGNS:    Visit Vitals  /76   Pulse 68   Ht 5' 7\" (1.702 m)   Wt 104.9 kg   LMP 06/06/2016   SpO2 100%   BMI 36.20 kg/m²     GENERAL:  Well-developed, well-nourished female, in no acute distress.  She is alert and oriented x 3 with a normal affect.   CARDIOVASCULAR:  Regular rate and rhythm without murmurs, rubs or gallops.   LUNGS:  Clear to auscultation bilaterally with good aeration and normal respiratory effort, without retractions appreciated.  LOWER EXTREMITIES:  Without edema.  .Previous PNs/studies/Labs per Epic reviewed and discussed      ASSESSMENT:   1. Type 2 diabetes mellitus without complication, without long-term current use of insulin (CMS/[facility])    2. Essential hypertension    3. BMI 36.0-36.9,adult    4. Nutritional  counseling    5. Medication management    6. Adjustment disorder with other symptom    7. Hyperlipidemia, unspecified hyperlipidemia type        PLAN:  Return if symptoms worsen or fail to improve, for f/u dm/work 2 months.  Follow up as needed, if symptoms worsen, or fail to improve.   Orders Placed This Encounter   • fluconazole (DIFLUCAN) 150 MG tablet     I applaud the pt w/ the awesome lifestyle changes and encourage pt to continue healthy po and regular exercise.     Cont stress reduction/counseling.    Patient is instructed to email me to discuss test results via [email or web address] 48 hours after the tests are done unless otherwise requested or call if not received results in 2 weeks of them being completed.     Continue medicines per Epic as applicable.    Please see AVS for other details of the remaining discussion.        If you are a smoker, it is important for your health to stop smoking. Please be aware that second hand smoke is also harmful.

## 2025-03-13 NOTE — DISCHARGE NOTE PROVIDER - NSDCCPCAREPLAN_GEN_ALL_CORE_FT
PRINCIPAL DISCHARGE DIAGNOSIS  Diagnosis: Choledocholithiasis  Assessment and Plan of Treatment: s/p laparoscopic cholecystectomy and IOC   s/p ERCP, biliary sphincterotomy.  - Start omeprazole 40mg twice daily x 8 weeks  - Repeat EGD in 3 months for ulcer healing follow up with Dr Duque      SECONDARY DISCHARGE DIAGNOSES  Diagnosis: Transaminitis  Assessment and Plan of Treatment:     Diagnosis: Leukocytosis  Assessment and Plan of Treatment:

## 2025-03-13 NOTE — DISCHARGE NOTE PROVIDER - NSDCCAREPROVSEEN_GEN_ALL_CORE_FT
Chart Update/Event Note Event Note Event Note Event Note Event Note/Psych Psychiatry/Event Note yes Marcel Banerjee

## 2025-03-13 NOTE — DISCHARGE NOTE NURSING/CASE MANAGEMENT/SOCIAL WORK - FINANCIAL ASSISTANCE
[facility] provides services at a reduced cost to those who are determined to be eligible through [facility]’s financial assistance program. Information regarding [facility]’s financial assistance program can be found by going to [email or web address] or by calling [phone].

## 2025-03-13 NOTE — DISCHARGE NOTE PROVIDER - NSDCFUADDAPPT_GEN_ALL_CORE_FT
Please call your primary care doctor to schedule an appointment within the next 1-2 weeks to discuss your recent hospitalization. Please bring copies of your discharge paperwork.     You will need to follow up with Dr Duque (gastroenterology) for a repeat endoscopy in 3 months.

## 2025-03-13 NOTE — DISCHARGE NOTE PROVIDER - CARE PROVIDERS DIRECT ADDRESSES
[email or web address]

## 2025-03-13 NOTE — DISCHARGE NOTE NURSING/CASE MANAGEMENT/SOCIAL WORK - NSDPACMPNY_GEN_ALL_CORE
Nursing staff noting that patient tolerated clear liquid diet this morning, patient noting improvement, questioning if there is a possibility for discharge today.  Spoke with RN, to trial light low fatty transition diet for lunch.    Updated noting that patient tolerated diet well, pain is controlled with p.o. Norco, doing well overall.    I was able to speak with the patient personally, we discussed discharge instructions and follow-up next Thursday in general surgery clinic.  He does confirm that he is feeling well, pain under control, tolerating sandwich without concern.    Plan for discharge, patient is progressing well as expected postoperatively.  Norco script written for pain with discussed precautions with patient.  Will prescribe ciprofloxacin for 5 days.    Plan discussed with my attending, Dr. Tidwell.   
Spouse

## 2025-03-13 NOTE — DISCHARGE NOTE NURSING/CASE MANAGEMENT/SOCIAL WORK - PATIENT PORTAL LINK FT
You can access the FollowMyHealth Patient Portal offered by [facility] by registering at the following website: [email or web address]. By joining [facility]’s FollowMyHealth portal, you will also be able to view your health information using other applications (apps) compatible with our system.

## 2025-03-13 NOTE — PROGRESS NOTE ADULT - SUBJECTIVE AND OBJECTIVE BOX
GENERAL SURGERY PROGRESS NOTE    SEHEBA LORENZO  |  [id number]      S: NAEON. no n/v, pain controlled, marita RD. Pending plans from GI re poss ERCP today    O:   Vital Signs Last 24 Hrs  T(C): 36.5 (12 Mar 2025 06:43), Max: 36.8 (11 Mar 2025 14:08)  T(F): 97.7 (12 Mar 2025 06:43), Max: 98.3 (11 Mar 2025 18:00)  HR: 74 (12 Mar 2025 06:43) (71 - 104)  BP: 101/68 (12 Mar 2025 06:43) (99/64 - 128/68)  BP(mean): 76 (11 Mar 2025 20:00) (68 - 78)  RR: 17 (12 Mar 2025 06:43) (13 - 18)  SpO2: 99% (12 Mar 2025 06:43) (93% - 99%)    Parameters below as of 12 Mar 2025 06:43  Patient On (Oxygen Delivery Method): room air                            11.3   8.73  )-----------( 233      ( 12 Mar 2025 05:58 )             33.9     03-12    138  |  103  |  9   ----------------------------<  97  3.9   |  21[L]  |  0.49[L]    Ca    9.0      12 Mar 2025 05:58  Phos  4.2     03-12  Mg     2.00     03-12    TPro  6.8  /  Alb  3.7  /  TBili  0.6  /  DBili  <0.2  /  AST  99[H]  /  ALT  198[H]  /  AlkPhos  129[H]  03-12 03-11-25 @ 07:01  -  03-12-25 @ 07:00  --------------------------------------------------------  IN: 1050 mL / OUT: 450 mL / NET: 600 mL      Physical Exam  Gen: NAD, A&Ox3  Pulm: No respiratory distress, no subcostal retractions  CV: RRR, no JVD  Abd: Softly distended, NTTP, no rebound, no guarding, port sites incisions c/d/i.   Extremities:  FROM, warm and well perfused, equal bilateral muscle strength      A/P: 34y Female s/p laparoscopic cholecystectomy and IOC for acute cholecystitis and choledocholithiasis, 3/11  - f/u GI plans re ERCP today  -DVT prophylaxis w/ SCD  - Encouraged OOB  -Strict I&O's  -Analgesia and antiemetics as needed  -Monitor overnight    B team   [id number].    
GENERAL SURGERY PROGRESS NOTE    SHEEBA LORENZO  |  [id number]      S: NAEON. doing well, pain controlled, marita diet no n/v, home today    O:   Vital Signs Last 24 Hrs  T(C): 36.5 (13 Mar 2025 09:00), Max: 37 (12 Mar 2025 21:35)  T(F): 97.7 (13 Mar 2025 09:00), Max: 98.6 (12 Mar 2025 21:35)  HR: 95 (13 Mar 2025 09:00) (62 - 95)  BP: 111/70 (13 Mar 2025 09:00) (97/65 - 148/82)  BP(mean): --  RR: 17 (13 Mar 2025 09:00) (14 - 19)  SpO2: 97% (13 Mar 2025 09:00) (96% - 100%)    Parameters below as of 13 Mar 2025 09:00  Patient On (Oxygen Delivery Method): room air                            9.8    7.67  )-----------( 215      ( 13 Mar 2025 05:50 )             29.5     03-13    139  |  106  |  12  ----------------------------<  112[H]  3.6   |  24  |  0.66    Ca    8.6      13 Mar 2025 05:50  Phos  2.7     03-13  Mg     1.80     03-13    TPro  6.5  /  Alb  3.5  /  TBili  0.4  /  DBili  x   /  AST  39[H]  /  ALT  128[H]  /  AlkPhos  98  03-13 03-12-25 @ 07:01  -  03-13-25 @ 07:00  --------------------------------------------------------  IN: 2760 mL / OUT: 601 mL / NET: 2159 mL        Physical Exam  Gen: NAD, A&Ox3  Pulm: No respiratory distress, no subcostal retractions  CV: RRR, no JVD  Abd: Softly distended, NTTP, no rebound, no guarding, port sites incisions c/d/i.   Extremities:  FROM, warm and well perfused, equal bilateral muscle strength      A/P: 34y Female s/p laparoscopic cholecystectomy and IOC for acute cholecystitis and choledocholithiasis, 3/11 Now s/p ERCP, biliary sphincterotomy.     - home today  - RD  - DVT prophylaxis w/ SCD.   - Encouraged OOB  -Strict I&O's  -Analgesia and antiemetics as needed  - Start omeprazole 40mg BID x 8 weeks  - Repeat EGD in 3 months for ulcer healing follow up.      B team   [id number].

## 2025-03-17 LAB — SURGICAL PATHOLOGY STUDY: SIGNIFICANT CHANGE UP

## 2025-04-02 LAB — SURGICAL PATHOLOGY STUDY: SIGNIFICANT CHANGE UP

## (undated) DEVICE — BLADE SCALPEL SAFETYLOCK #10

## (undated) DEVICE — DRSG BANDAID 0.75X3"

## (undated) DEVICE — XI ARM CLIP APPLIER LARGE

## (undated) DEVICE — XI ARM FORCEP FENESTRATED BIPOLAR 8MM

## (undated) DEVICE — BITE BLOCK ADULT 20 X 27MM (GREEN)

## (undated) DEVICE — BASIN EMESIS 10IN GRADUATED MAUVE

## (undated) DEVICE — PREP CHLORAPREP HI-LITE ORANGE 26ML

## (undated) DEVICE — ELCTR BOVIE PENCIL SMOKE EVACUATION

## (undated) DEVICE — SYR LUER LOK 20CC

## (undated) DEVICE — XI SCISSOR TIP COVER

## (undated) DEVICE — DRSG 2X2

## (undated) DEVICE — SUT MONOCRYL 4-0 27" PS-2 UNDYED

## (undated) DEVICE — PACK GENERAL LAPAROSCOPY

## (undated) DEVICE — OMNIPAQUE 300  30ML

## (undated) DEVICE — DRSG CURITY GAUZE SPONGE 4 X 4" 12-PLY NON-STERILE

## (undated) DEVICE — XI ENDOWRIST 12 - 8 MM CANNULA REDUCER

## (undated) DEVICE — POSITIONER STRAP ARMBOARD VELCRO TS-30

## (undated) DEVICE — XI ARM CLIP APPLIER MEDIUM-LARGE

## (undated) DEVICE — SYR LUER LOK 3CC

## (undated) DEVICE — SOL IRR POUR NS 0.9% 500ML

## (undated) DEVICE — VENODYNE/SCD SLEEVE CALF MEDIUM

## (undated) DEVICE — BLADE SURGICAL #15 CARBON

## (undated) DEVICE — TIP METZENBAUM SCISSOR MONOPOLAR ENDOCUT (ORANGE)

## (undated) DEVICE — INJ SYS RAP REFIL

## (undated) DEVICE — PACK IV START WITH CHG

## (undated) DEVICE — LOK DVC RX AND BIOPSY

## (undated) DEVICE — XI ARM PERMANENT CAUTERY HOOK

## (undated) DEVICE — DRAPE TOWEL BLUE 17" X 24"

## (undated) DEVICE — TROCAR COVIDIEN VERSAPORT BLADELESS OPTICAL 5MM STANDARD

## (undated) DEVICE — XI ARM SCISSOR MONO CURVED

## (undated) DEVICE — XI SEAL UNIVERSIAL 5-12MM

## (undated) DEVICE — XI OBTURATOR OPTICAL BLADELESS 8MM

## (undated) DEVICE — ENDOCATCH 10MM

## (undated) DEVICE — PACK ROBOTIC LIJ

## (undated) DEVICE — NDL HYPO SAFE 22G X 1" (BLACK)

## (undated) DEVICE — TUBING OLYMPUS INSUFFLATION

## (undated) DEVICE — GLV 7 PROTEXIS (WHITE)

## (undated) DEVICE — XI DRAPE ARM

## (undated) DEVICE — POSITIONER PURPLE ARM ONE STEP (LARGE)

## (undated) DEVICE — XI DRAPE COLUMN

## (undated) DEVICE — DRAPE WARMING SOLUTION 44 X 44"

## (undated) DEVICE — DISSECTOR ENDOSCOPIC KITTNER SINGLE TIP

## (undated) DEVICE — SOL IRR POUR H2O 500ML

## (undated) DEVICE — OLYMPUS DISTAL COVER ENDOSCOPE

## (undated) DEVICE — SOL IRR POUR H2O 250ML

## (undated) DEVICE — SUT VICRYL 0 27" UR-6

## (undated) DEVICE — SALIVA EJECTOR (BLUE)

## (undated) DEVICE — ELCTR GROUNDING PAD ADULT COVIDIEN

## (undated) DEVICE — ELCTR BOVIE TIP BLADE INSULATED 2.75" EDGE

## (undated) DEVICE — WARMING BLANKET LOWER ADULT

## (undated) DEVICE — TUBING SUCTION 20FT

## (undated) DEVICE — DRSG STERISTRIPS 0.5 X 4"

## (undated) DEVICE — DENTURE CUP PINK

## (undated) DEVICE — SOL INJ NS 0.9% 500ML 1-PORT

## (undated) DEVICE — SOL IRR POUR NS 0.9% 1000ML

## (undated) DEVICE — TUBING STRYKEFLOW II SUCTION / IRRIGATOR

## (undated) DEVICE — PROTECTOR HEEL / ELBOW FLUFFY

## (undated) DEVICE — DRSG DERMABOND 0.7ML

## (undated) DEVICE — GLV 7.5 PROTEXIS (WHITE)

## (undated) DEVICE — TROCAR COVIDIEN VERSAONE FIXATION CANNULA 5MM

## (undated) DEVICE — POSITIONER FOAM HEAD CRADLE (PINK)

## (undated) DEVICE — DRAPE 3/4 SHEET 52X76"

## (undated) DEVICE — ELCTR ECG CONDUCTIVE ADHESIVE

## (undated) DEVICE — WARMING BLANKET UPPER ADULT

## (undated) DEVICE — XI ARM FORCEP PROGRASP 8MM

## (undated) DEVICE — SYR LUER LOK 10CC

## (undated) DEVICE — XI ENDOWRIST SUCTION IRRIGATOR 8MM

## (undated) DEVICE — VENODYNE/SCD SLEEVE FOOT

## (undated) DEVICE — TROCAR COVIDIEN BLUNT TIP HASSAN 10MM STANDARD

## (undated) DEVICE — UNDERPAD LINEN SAVER 17 X 24"

## (undated) DEVICE — TUBING SUCTION NONCONDUCTIVE 6MM X 12FT

## (undated) DEVICE — CATH IV SAFE BC 22G X 1" (BLUE)

## (undated) DEVICE — BASIN SET SINGLE

## (undated) DEVICE — POSITIONER FOAM EGG CRATE ULNAR 2PCS (PINK)

## (undated) DEVICE — GOWN LG

## (undated) DEVICE — XI 12MM AND STAPLER CANNULA SEAL

## (undated) DEVICE — GLV 7.5 PROTEXIS (BLUE)

## (undated) DEVICE — D HELP - CLEARVIEW CLEARIFY SYSTEM